# Patient Record
Sex: FEMALE | Race: WHITE | Employment: PART TIME | URBAN - METROPOLITAN AREA
[De-identification: names, ages, dates, MRNs, and addresses within clinical notes are randomized per-mention and may not be internally consistent; named-entity substitution may affect disease eponyms.]

---

## 2020-03-13 ENCOUNTER — HOSPITAL ENCOUNTER (EMERGENCY)
Age: 20
Discharge: HOME | End: 2020-03-13
Attending: EMERGENCY MEDICINE

## 2020-03-13 VITALS
WEIGHT: 238 LBS | HEART RATE: 87 BPM | SYSTOLIC BLOOD PRESSURE: 138 MMHG | HEIGHT: 66 IN | OXYGEN SATURATION: 100 % | DIASTOLIC BLOOD PRESSURE: 91 MMHG | TEMPERATURE: 97.8 F | BODY MASS INDEX: 38.25 KG/M2 | RESPIRATION RATE: 18 BRPM

## 2020-03-13 PROCEDURE — 99283 EMERGENCY DEPT VISIT LOW MDM: CPT

## 2020-03-13 ASSESSMENT — PAIN SCALES - GENERAL: PAINLEVEL_OUTOF10: 4

## 2020-03-14 NOTE — ED TRIAGE NOTES
Pt presents to ED c/o burns to head from dying her hair. Pt rates pain 4/10. Pt is alert and oriented.

## 2020-03-14 NOTE — ED PROVIDER NOTES
Physical activity     Days per week: Not on file     Minutes per session: Not on file    Stress: Not on file   Relationships    Social connections     Talks on phone: Not on file     Gets together: Not on file     Attends Anglican service: Not on file     Active member of club or organization: Not on file     Attends meetings of clubs or organizations: Not on file     Relationship status: Not on file    Intimate partner violence     Fear of current or ex partner: Not on file     Emotionally abused: Not on file     Physically abused: Not on file     Forced sexual activity: Not on file   Other Topics Concern    Not on file   Social History Narrative    Not on file     No current facility-administered medications for this encounter. Current Outpatient Medications   Medication Sig Dispense Refill    RisperiDONE (RISPERDAL PO) Take by mouth      saline nasal gel (AYR) GEL by Nasal route as needed (nasal dryness) 1 Tube 3     Allergies   Allergen Reactions    Sulfa Antibiotics Hives and Swelling       Nursing Notes Reviewed    Physical Exam:  ED Triage Vitals [03/13/20 2313]   Enc Vitals Group      BP (!) 138/91      Pulse 87      Resp 18      Temp 97.8 °F (36.6 °C)      Temp Source Oral      SpO2 100 %      Weight 238 lb (108 kg)      Height 5' 6\" (1.676 m)      Head Circumference       Peak Flow       Pain Score       Pain Loc       Pain Edu? Excl. in [address]? My pulse ox interpretation is - normal    General appearance:  No acute distress. Skin:  Warm. Dry. Head: Hair with dark roots, bleached appearance with overlying pink and red. There are a couple of patches on the top of the scalp that appear mildly erythematous, irritated, no swelling or tenderness to palpation, no blistering. This is an 2 small areas over the left very top of the head, less than quarter size. The rest of the scalp does not appear to have any irritation or redness, no bleeding, no swelling, nontender.   Eye: Extraocular movements intact. Ears, nose, mouth and throat:  Oral mucosa moist   Neck:  Trachea midline. Extremity:  No swelling. Normal ROM    Heart:  Regular rate and rhythm  Perfusion:  intact  Respiratory: Respirations nonlabored. Abdominal:  Non distended. Neurological:  Alert and oriented, normal gait. Psychiatric:  Appropriate    I have reviewed and interpreted all of the currently available lab results from this visit (if applicable):  No results found for this visit on 03/13/20. Radiographs (if obtained):  Radiologist's Report Reviewed:  No results found. EKG (if obtained): (All EKG's are interpreted by myself in the absence of a cardiologist)      MDM:  78-year-old female presents and appears to have some mild chemical irritation from a new hair dye product on exam, no actual chemical burn or other emergent process, advised could take Tylenol or ibuprofen if wished, should improve over the next 12 to 24 hours, advised avoiding direct hydrogen peroxide to the scalp in the future. She will be discharged in stable condition    Clinical Impression:  1. Scalp pain    2. Exposure to chemical irritant      Disposition referral (if applicable):  No follow-up provider specified. Disposition medications (if applicable):  Discharge Medication List as of 3/13/2020 11:26 PM        ED Provider Disposition Time  DISPOSITION Decision To Discharge 03/13/2020 11:17:31 PM      Comment: Please note this report has been produced using speech recognition software and may contain errors related to that system including errors in grammar, punctuation, and spelling, as well as words and phrases that may be inappropriate. Efforts were made to edit the dictations.         Rafael Araiza MD  03/14/20 0104

## 2020-03-14 NOTE — ED NOTES
RN reviewed discharge instructions with pt. Pt denies having questions at this time. Pt is a&ox4. No IV upon discharge.       Naga Interiano RN  03/13/20 020

## 2020-12-03 PROBLEM — K80.10 CALCULUS OF GALLBLADDER WITH CHRONIC CHOLECYSTITIS WITHOUT OBSTRUCTION: Status: ACTIVE | Noted: 2020-12-03

## 2022-01-07 ENCOUNTER — HOSPITAL ENCOUNTER (OUTPATIENT)
Age: 22
Setting detail: SPECIMEN
Discharge: HOME | End: 2022-01-07
Payer: COMMERCIAL

## 2022-01-07 ENCOUNTER — OFFICE VISIT (OUTPATIENT)
Dept: OBGYN | Age: 22
End: 2022-01-07
Payer: COMMERCIAL

## 2022-01-07 VITALS
WEIGHT: 236 LBS | SYSTOLIC BLOOD PRESSURE: 117 MMHG | DIASTOLIC BLOOD PRESSURE: 79 MMHG | BODY MASS INDEX: 39.32 KG/M2 | HEIGHT: 65 IN | HEART RATE: 70 BPM

## 2022-01-07 DIAGNOSIS — Z01.419 WOMEN'S ANNUAL ROUTINE GYNECOLOGICAL EXAMINATION: Primary | ICD-10-CM

## 2022-01-07 DIAGNOSIS — N91.2 AMENORRHEA: ICD-10-CM

## 2022-01-07 DIAGNOSIS — N89.8 VAGINAL ODOR: ICD-10-CM

## 2022-01-07 DIAGNOSIS — N92.6 IRREGULAR MENSES: ICD-10-CM

## 2022-01-07 LAB
CONTROL: NORMAL
PREGNANCY TEST URINE, POC: NEGATIVE

## 2022-01-07 PROCEDURE — 81025 URINE PREGNANCY TEST: CPT

## 2022-01-07 PROCEDURE — 99385 PREV VISIT NEW AGE 18-39: CPT

## 2022-01-07 PROCEDURE — 88142 CYTOPATH C/V THIN LAYER: CPT

## 2022-01-07 RX ORDER — FAMOTIDINE 10 MG
10 TABLET ORAL 2 TIMES DAILY
COMMUNITY
End: 2022-01-27

## 2022-01-07 RX ORDER — RISPERIDONE 0.25 MG/1
0.25 TABLET, FILM COATED ORAL 2 TIMES DAILY
COMMUNITY
End: 2022-01-27

## 2022-01-07 RX ORDER — MONTELUKAST SODIUM 4 MG/1
2 TABLET, CHEWABLE ORAL 2 TIMES DAILY
COMMUNITY

## 2022-01-07 RX ORDER — HYDROXYZINE HYDROCHLORIDE 10 MG/1
10 TABLET, FILM COATED ORAL 3 TIMES DAILY PRN
COMMUNITY
End: 2022-01-27 | Stop reason: CLARIF

## 2022-01-07 RX ORDER — NORGESTIMATE AND ETHINYL ESTRADIOL 0.25-0.035
1 KIT ORAL DAILY
Qty: 1 PACKET | Refills: 11 | Status: SHIPPED | OUTPATIENT
Start: 2022-01-07 | End: 2022-01-27

## 2022-01-07 RX ORDER — CETIRIZINE HYDROCHLORIDE 10 MG/1
10 TABLET ORAL DAILY
COMMUNITY
End: 2022-01-27

## 2022-01-07 ASSESSMENT — ENCOUNTER SYMPTOMS
GASTROINTESTINAL NEGATIVE: 1
ABDOMINAL PAIN: 0
RESPIRATORY NEGATIVE: 1

## 2022-01-07 NOTE — PROGRESS NOTES
daily as needed for Itching      Probiotic Product (PROBIOTIC-10 PO) Take by mouth      cetirizine (ZYRTEC) 10 MG tablet Take 10 mg by mouth daily      norgestimate-ethinyl estradiol (ORTHO-CYCLEN, 28,) 0.25-35 MG-MCG per tablet Take 1 tablet by mouth daily 1 packet 11    lithium (ESKALITH) 450 MG extended release tablet TAKE 1 TABLET BY MOUTH THREE TIMES A DAY      topiramate (TOPAMAX) 50 MG tablet TAKE 1 TABLET (50 MG TOTAL) BY MOUTH EVERY MORNING AND 2 TABLETS (100 MG TOTAL) AT BEDTIME.  etonogestrel-ethinyl estradiol (NUVARING) 0.12-0.015 MG/24HR vaginal ring INSERT 1 RING VAGINALLY EVERY 21 DAYS AND REMOVE FOR 7 DAYS      Biotin 68747 MCG TABS Take by mouth      Omega-3 Fatty Acids (FISH OIL) 1000 MG CPDR Take 5,000 mg by mouth      ferrous sulfate (IRON SLOW RELEASE) 142 (45 Fe) MG extended release tablet Take 142 mg by mouth daily      dicyclomine (BENTYL) 20 MG tablet TAKE 1 TABLET (20 MG TOTAL) BY MOUTH EVERY 6 HOURS AS NEEDED. (Patient not taking: Reported on 2022)      Ashwagandha 125 MG CAPS Take by mouth (Patient not taking: Reported on 2022)      loratadine (CLARITIN) 10 MG capsule Take 10 mg by mouth daily (Patient not taking: Reported on 2022)      saline nasal gel (AYR) GEL by Nasal route as needed (nasal dryness) (Patient not taking: Reported on 2022) 1 Tube 3     No current facility-administered medications for this visit. Allergies   Allergen Reactions    Sulfa Antibiotics Hives and Swelling           Immunization History   Administered Date(s) Administered    COVID-19, Pfizer, PF, 30mcg/0.3mL 2021, 2021       Review of Systems   Constitutional: Negative. Negative for fatigue and fever. Respiratory: Negative. Gastrointestinal: Negative. Negative for abdominal pain. Endocrine: Negative. Genitourinary: Positive for menstrual problem. Negative for vaginal pain. Vaginal odor   Musculoskeletal: Negative. Skin: Negative. Neurological: Negative. Negative for dizziness and headaches. Psychiatric/Behavioral: Negative. /79   Pulse 70   Ht 5' 5\" (1.651 m)   Wt 236 lb (107 kg)   LMP 11/18/2021   BMI 39.27 kg/m²     Physical Exam  Vitals and nursing note reviewed. Constitutional:       General: She is not in acute distress. Appearance: Normal appearance. She is normal weight. HENT:      Head: Normocephalic and atraumatic. Pulmonary:      Effort: Pulmonary effort is normal. No respiratory distress. Chest:   Breasts:      Right: Normal. No axillary adenopathy or supraclavicular adenopathy. Left: Normal. No axillary adenopathy or supraclavicular adenopathy. Abdominal:      Palpations: Abdomen is soft. Tenderness: There is no abdominal tenderness. Genitourinary:     General: Normal vulva. Exam position: Lithotomy position. Vagina: Normal.      Cervix: Normal.      Uterus: Normal.       Adnexa: Right adnexa normal and left adnexa normal.   Musculoskeletal:         General: Normal range of motion. Cervical back: Normal range of motion. Lymphadenopathy:      Upper Body:      Right upper body: No supraclavicular or axillary adenopathy. Left upper body: No supraclavicular or axillary adenopathy. Skin:     General: Skin is warm and dry. Findings: No rash. Neurological:      General: No focal deficit present. Mental Status: She is alert and oriented to person, place, and time. Psychiatric:         Mood and Affect: Mood normal.         Behavior: Behavior normal.         Thought Content: Thought content normal.         Results for orders placed or performed in visit on 01/07/22   POCT urine pregnancy   Result Value Ref Range    Preg Test, Ur negative     Control         Assessment and Plan   Diagnosis Orders   1. Women's annual routine gynecological examination  PAP SMEAR   2. Amenorrhea  POCT urine pregnancy   3.  Irregular menses  norgestimate-ethinyl estradiol (ORTHO-CYCLEN, 28,) 0.25-35 MG-MCG per tablet   4. Vaginal odor  Vaginal Pathogens Probes *A    C.trachomatis N.gonorrhoeae DNA     Pap today, swabs. Vaginal hygiene measures reviewed. Urine pregnancy test negative. Discussed various contraceptive methods including Nexplanon, IUDs, OCPs, patch, depo shot. Pt states she has had nexplanon before and does not want it again, she has also tried sprintec before without issues. Pt wishes to proceed with sprintec at this time, f/u in 3 months. Return in about 3 months (around 4/7/2022) for medication check.     Sherrell Nava PA-C

## 2022-01-08 LAB
CANDIDA SPECIES, DNA PROBE: ABNORMAL
GARDNERELLA VAGINALIS, DNA PROBE: ABNORMAL
TRICHOMONAS VAGINALIS DNA: ABNORMAL

## 2022-01-09 LAB
C TRACH DNA GENITAL QL NAA+PROBE: NEGATIVE
N. GONORRHOEAE DNA: NEGATIVE

## 2022-01-10 DIAGNOSIS — B96.89 BACTERIAL VAGINOSIS: Primary | ICD-10-CM

## 2022-01-10 DIAGNOSIS — N76.0 BACTERIAL VAGINOSIS: Primary | ICD-10-CM

## 2022-01-10 RX ORDER — METRONIDAZOLE 500 MG/1
500 TABLET ORAL 2 TIMES DAILY
Qty: 14 TABLET | Refills: 0 | Status: SHIPPED | OUTPATIENT
Start: 2022-01-10 | End: 2022-01-17

## 2022-01-27 ENCOUNTER — HOSPITAL ENCOUNTER (EMERGENCY)
Age: 22
Discharge: HOME | End: 2022-01-27
Attending: EMERGENCY MEDICINE
Payer: COMMERCIAL

## 2022-01-27 ENCOUNTER — APPOINTMENT (OUTPATIENT)
Dept: CT IMAGING | Age: 22
End: 2022-01-27
Payer: COMMERCIAL

## 2022-01-27 ENCOUNTER — APPOINTMENT (OUTPATIENT)
Dept: GENERAL RADIOLOGY | Age: 22
End: 2022-01-27
Payer: COMMERCIAL

## 2022-01-27 VITALS
TEMPERATURE: 97.7 F | RESPIRATION RATE: 16 BRPM | SYSTOLIC BLOOD PRESSURE: 127 MMHG | HEIGHT: 65 IN | WEIGHT: 230 LBS | OXYGEN SATURATION: 99 % | BODY MASS INDEX: 38.32 KG/M2 | HEART RATE: 70 BPM | DIASTOLIC BLOOD PRESSURE: 87 MMHG

## 2022-01-27 DIAGNOSIS — R56.9 SEIZURE-LIKE ACTIVITY (HCC): ICD-10-CM

## 2022-01-27 DIAGNOSIS — R20.2 PARESTHESIA: Primary | ICD-10-CM

## 2022-01-27 LAB
ACETAMINOPHEN LEVEL: <5 UG/ML (ref 15–30)
ALBUMIN SERPL-MCNC: 4.1 GM/DL (ref 3.4–5)
ALCOHOL SCREEN SERUM: <0.01 %WT/VOL
ALP BLD-CCNC: 65 IU/L (ref 40–129)
ALT SERPL-CCNC: 33 U/L (ref 10–40)
AMMONIA: 33 UMOL/L (ref 11–51)
AMPHETAMINES: NEGATIVE
ANION GAP SERPL CALCULATED.3IONS-SCNC: 13 MMOL/L (ref 4–16)
AST SERPL-CCNC: 22 IU/L (ref 15–37)
BACTERIA: NEGATIVE /HPF
BARBITURATE SCREEN URINE: NEGATIVE
BASOPHILS ABSOLUTE: 0.1 K/CU MM
BASOPHILS RELATIVE PERCENT: 0.9 % (ref 0–1)
BENZODIAZEPINE SCREEN, URINE: NEGATIVE
BILIRUB SERPL-MCNC: 0.5 MG/DL (ref 0–1)
BILIRUBIN URINE: NEGATIVE MG/DL
BLOOD, URINE: ABNORMAL
BUN BLDV-MCNC: 7 MG/DL (ref 6–23)
CALCIUM SERPL-MCNC: 9 MG/DL (ref 8.3–10.6)
CANNABINOID SCREEN URINE: NEGATIVE
CHLORIDE BLD-SCNC: 107 MMOL/L (ref 99–110)
CLARITY: ABNORMAL
CO2: 17 MMOL/L (ref 21–32)
COCAINE METABOLITE: NEGATIVE
COLOR: YELLOW
CREAT SERPL-MCNC: 0.8 MG/DL (ref 0.6–1.1)
DIFFERENTIAL TYPE: ABNORMAL
DOSE AMOUNT: ABNORMAL
DOSE AMOUNT: ABNORMAL
DOSE TIME: ABNORMAL
DOSE TIME: ABNORMAL
EKG ATRIAL RATE: 80 BPM
EKG DIAGNOSIS: NORMAL
EKG P AXIS: 57 DEGREES
EKG P-R INTERVAL: 138 MS
EKG Q-T INTERVAL: 390 MS
EKG QRS DURATION: 78 MS
EKG QTC CALCULATION (BAZETT): 449 MS
EKG R AXIS: 11 DEGREES
EKG T AXIS: -4 DEGREES
EKG VENTRICULAR RATE: 80 BPM
EOSINOPHILS ABSOLUTE: 0.4 K/CU MM
EOSINOPHILS RELATIVE PERCENT: 3.8 % (ref 0–3)
GFR AFRICAN AMERICAN: >60 ML/MIN/1.73M2
GFR NON-AFRICAN AMERICAN: >60 ML/MIN/1.73M2
GLUCOSE BLD-MCNC: 110 MG/DL (ref 70–99)
GLUCOSE BLD-MCNC: 92 MG/DL (ref 70–99)
GLUCOSE, URINE: NEGATIVE MG/DL
HCT VFR BLD CALC: 43.9 % (ref 37–47)
HEMOGLOBIN: 14.5 GM/DL (ref 12.5–16)
IMMATURE NEUTROPHIL %: 0.8 % (ref 0–0.43)
INR BLD: 0.81 INDEX
INTERPRETATION: NORMAL
KETONES, URINE: NEGATIVE MG/DL
LEUKOCYTE ESTERASE, URINE: ABNORMAL
LITHIUM LEVEL: 0.7 MMOL/L (ref 0.6–1.2)
LYMPHOCYTES ABSOLUTE: 3.2 K/CU MM
LYMPHOCYTES RELATIVE PERCENT: 33.2 % (ref 24–44)
MCH RBC QN AUTO: 31.7 PG (ref 27–31)
MCHC RBC AUTO-ENTMCNC: 33 % (ref 32–36)
MCV RBC AUTO: 95.9 FL (ref 78–100)
MONOCYTES ABSOLUTE: 0.8 K/CU MM
MONOCYTES RELATIVE PERCENT: 7.9 % (ref 0–4)
MUCUS: ABNORMAL HPF
NITRITE URINE, QUANTITATIVE: NEGATIVE
NUCLEATED RBC %: 0 %
OPIATES, URINE: NEGATIVE
OXYCODONE: NEGATIVE
PDW BLD-RTO: 11.8 % (ref 11.7–14.9)
PH, URINE: 8 (ref 5–8)
PHENCYCLIDINE, URINE: NEGATIVE
PLATELET # BLD: 426 K/CU MM (ref 140–440)
PMV BLD AUTO: 9.7 FL (ref 7.5–11.1)
POTASSIUM SERPL-SCNC: 3.7 MMOL/L (ref 3.5–5.1)
PREGNANCY, URINE: NEGATIVE
PROTEIN UA: NEGATIVE MG/DL
PROTHROMBIN TIME: 10.4 SECONDS (ref 11.7–14.5)
RBC # BLD: 4.58 M/CU MM (ref 4.2–5.4)
RBC URINE: 1 /HPF (ref 0–6)
SALICYLATE LEVEL: 1.5 MG/DL (ref 15–30)
SEGMENTED NEUTROPHILS ABSOLUTE COUNT: 5.2 K/CU MM
SEGMENTED NEUTROPHILS RELATIVE PERCENT: 53.4 % (ref 36–66)
SODIUM BLD-SCNC: 137 MMOL/L (ref 135–145)
SPECIFIC GRAVITY UA: 1 (ref 1–1.03)
SPECIFIC GRAVITY, URINE: 1 (ref 1–1.03)
SQUAMOUS EPITHELIAL: 14 /HPF
TOTAL IMMATURE NEUTOROPHIL: 0.08 K/CU MM
TOTAL NUCLEATED RBC: 0 K/CU MM
TOTAL PROTEIN: 7.1 GM/DL (ref 6.4–8.2)
TRICHOMONAS: ABNORMAL /HPF
TROPONIN T: <0.01 NG/ML
UROBILINOGEN, URINE: NEGATIVE MG/DL (ref 0.2–1)
WBC # BLD: 9.8 K/CU MM (ref 4–10.5)
WBC UA: 2 /HPF (ref 0–5)

## 2022-01-27 PROCEDURE — 80053 COMPREHEN METABOLIC PANEL: CPT

## 2022-01-27 PROCEDURE — G0480 DRUG TEST DEF 1-7 CLASSES: HCPCS

## 2022-01-27 PROCEDURE — 80201 ASSAY OF TOPIRAMATE: CPT

## 2022-01-27 PROCEDURE — 82140 ASSAY OF AMMONIA: CPT

## 2022-01-27 PROCEDURE — 71045 X-RAY EXAM CHEST 1 VIEW: CPT

## 2022-01-27 PROCEDURE — 81001 URINALYSIS AUTO W/SCOPE: CPT

## 2022-01-27 PROCEDURE — 93005 ELECTROCARDIOGRAM TRACING: CPT | Performed by: EMERGENCY MEDICINE

## 2022-01-27 PROCEDURE — 85025 COMPLETE CBC W/AUTO DIFF WBC: CPT

## 2022-01-27 PROCEDURE — 85610 PROTHROMBIN TIME: CPT

## 2022-01-27 PROCEDURE — 82962 GLUCOSE BLOOD TEST: CPT

## 2022-01-27 PROCEDURE — 93010 ELECTROCARDIOGRAM REPORT: CPT | Performed by: INTERNAL MEDICINE

## 2022-01-27 PROCEDURE — 84484 ASSAY OF TROPONIN QUANT: CPT

## 2022-01-27 PROCEDURE — 99285 EMERGENCY DEPT VISIT HI MDM: CPT

## 2022-01-27 PROCEDURE — 80307 DRUG TEST PRSMV CHEM ANLYZR: CPT

## 2022-01-27 PROCEDURE — 80178 ASSAY OF LITHIUM: CPT

## 2022-01-27 PROCEDURE — 70450 CT HEAD/BRAIN W/O DYE: CPT

## 2022-01-27 PROCEDURE — 81025 URINE PREGNANCY TEST: CPT

## 2022-01-27 RX ORDER — RISPERIDONE 1 MG/1
1 TABLET, FILM COATED ORAL NIGHTLY
COMMUNITY

## 2022-01-27 RX ORDER — LITHIUM CARBONATE 450 MG
450 TABLET, EXTENDED RELEASE ORAL 2 TIMES DAILY
COMMUNITY

## 2022-01-27 RX ORDER — HYDROXYZINE PAMOATE 25 MG/1
25 CAPSULE ORAL NIGHTLY
COMMUNITY

## 2022-01-27 NOTE — Clinical Note
Heather Rodriguez was seen and treated in our emergency department on 1/27/2022. She may return to work on 01/31/2022. If you have any questions or concerns, please don't hesitate to call.       Marion Roblero MD

## 2022-01-27 NOTE — ED NOTES
Pt. Experienced approximately 2 minutes of tremors and nausea during transfer from Trauma room 4 to exam room 32.      Clay Show  01/27/22 8769

## 2022-01-27 NOTE — ED NOTES
CT Head WO Contrast    Status: Final result       Order Providers    Authorizing Billing   Court MD Chano Faustin MD            Signed by    Signed Date/Time Phone Pager   Mekhi Pulido 1/27/2022 12:26 [phone]      Reading Providers    Read Date Phone Pager   Mekhi Pulido Thu Jan 27, 2022 12:26 [phone]        CT Head WO Contrast: Patient Communication     Not Released  Not seen     Radiation Dose Estimates    No radiation information found for this patient  Narrative   EXAMINATION:   CT OF THE HEAD WITHOUT CONTRAST  1/27/2022 12:14 pm       TECHNIQUE:   CT of the head was performed without the administration of intravenous   contrast. Dose modulation, iterative reconstruction, and/or weight based   adjustment of the mA/kV was utilized to reduce the radiation dose to as low   as reasonably achievable.       COMPARISON:   None.       HISTORY:   Reason for Exam: paresthesia bilateral hands       FINDINGS:   BRAIN/VENTRICLES: No acute intracranial hemorrhage, mass effect or midline   shift.  No abnormal extra-axial fluid collection.  The gray-white   differentiation is maintained without evidence of an acute infarct.  No   evidence of hydrocephalus.       ORBITS: The visualized portion of the orbits demonstrate no acute abnormality.       SINUSES: The visualized paranasal sinuses and mastoid air cells appear clear.       SOFT TISSUES/SKULL:  No acute abnormality of the visualized skull or soft   tissues.           Impression   Negative noncontrast CT examination of the brain.              Kerri Albarran RN  01/27/22 3397

## 2022-01-27 NOTE — ED NOTES
Bed: ED-32  Expected date:   Expected time:   Means of arrival:   Comments:  Kim Boyle  01/27/22 8259

## 2022-01-27 NOTE — ED PROVIDER NOTES
Emergency [address] EMERGENCY DEPARTMENT    Patient: Dariel Breen  MRN: [id number]  : 2000  Date of Evaluation: 2022  ED Provider: Corinne Monroe MD    Chief Complaint       Chief Complaint   Patient presents with    Tremors     head, hands    Numbness     tingling around mouth     Anson Daniels is a 19 y.o. female who presents to the emergency department for evaluation of perioral tingling, bilateral eyelid fluttering and shaking of hands bilaterally. Patient reports been usual state of health until approximate 11:00 today when symptoms first started. No reported fevers no psychotics no recent travel no change of diet or medications and no trauma. Patient says that she was driving when symptoms occurred never had similar symptoms to this in the past.  Denies any difficulty swallowing difficulty speaking or difficulty walking. Patient does report taking lithium she takes it for and 50 mg 3 times daily. No recent changes in medications. Never had similar symptoms of this in the past.    ROS:     At least 10 systems reviewed and otherwise acutely negative except as in the [address]     Past History     Past Medical History:   Diagnosis Date    Bipolar 1 disorder ([address]     Epistaxis     Irregular menses     Irritable bowel syndrome     Migraines     Obesity     Seasonal allergies      Past Surgical History:   Procedure Laterality Date    CHOLECYSTECTOMY       Social History     Socioeconomic History    Marital status: Single     Spouse name: None    Number of children: None    Years of education: None    Highest education level: None   Occupational History    None   Tobacco Use    Smoking status: Never Smoker    Smokeless tobacco: Never Used   Vaping Use    Vaping Use: Never used   Substance and Sexual Activity    Alcohol use: Not Currently    Drug use: Never    Sexual activity: None   Other Topics Concern    None Social History Narrative    None     Social Determinants of Health     Financial Resource Strain:     Difficulty of Paying Living Expenses: Not on file   Food Insecurity:     Worried About Running Out of Food in the Last Year: Not on file    Reese of Food in the Last Year: Not on file   Transportation Needs:     Lack of Transportation (Medical): Not on file    Lack of Transportation (Non-Medical):  Not on file   Physical Activity:     Days of Exercise per Week: Not on file    Minutes of Exercise per Session: Not on file   Stress:     Feeling of Stress : Not on file   Social Connections:     Frequency of Communication with Friends and Family: Not on file    Frequency of Social Gatherings with Friends and Family: Not on file    Attends Zoroastrianism Services: Not on file    Active Member of [address] [place] [address] [employer] or Organizations: Not on file    Attends Club or Organization Meetings: Not on file    Marital Status: Not on file   Intimate Partner Violence:     Fear of Current or Ex-Partner: Not on file    Emotionally Abused: Not on file    Physically Abused: Not on file    Sexually Abused: Not on file   Housing Stability:     Unable to Pay for Housing in the Last Year: Not on file    Number of [place] in the Last Year: Not on file    Unstable Housing in the Last Year: Not on file       Medications/Allergies     Previous Medications    COLESTIPOL (COLESTID) 1 G TABLET    Take 2 g by mouth 2 times daily     FERROUS SULFATE (IRON SLOW RELEASE) 142 (45 FE) MG EXTENDED RELEASE TABLET    Take 142 mg by mouth daily OTC    HYDROXYZINE (VISTARIL) 25 MG CAPSULE    Take 25 mg by mouth nightly    LITHIUM (ESKALITH) 450 MG EXTENDED RELEASE TABLET    Take 600 mg by mouth every morning Takes a  mg with the  to equal 600 mg q am.    LITHIUM (ESKALITH) 450 MG EXTENDED RELEASE TABLET    Take 450 mg by mouth 2 times daily Takes in the afternoon and at HS    RISPERIDONE (RISPERDAL) 1 MG TABLET    Take 1 mg by mouth nightly     TOPIRAMATE (TOPAMAX) 50 MG TABLET    50 mg 2 times daily      Allergies   Allergen Reactions    Sulfa Antibiotics Hives and Swelling        Physical Exam       ED Triage Vitals [01/27/22 1148]   BP Temp Temp Source Pulse Resp SpO2 Height Weight   (!) 148/97 98.5 °F (36.9 °C) Oral 111 29 100 % 5' 5\" (1.651 m) 230 lb (104.3 kg)     GENERAL APPEARANCE: Awake and alert. Cooperative. No acute distress. HEAD: Normocephalic. Atraumatic. EYES: Sclera anicteric. Pupils equal round reactive to light extraocular movements are intact  ENT: Tolerates saliva. No trismus. Moist mucous membranes  NECK: Supple. Trachea midline. No meningismus  CARDIO: RRR. Radial pulse 2+. No murmurs rubs or gallops appreciated  LUNGS: Respirations unlabored. CTAB. No accessory muscle usage noted. No wheezes rales rhonchi or stridor. ABDOMEN: Soft. Non-distended. Non-tender. No tenderness in right upper quadrant or right lower quadrant to deep palpation  EXTREMITIES: No acute deformities. No unilateral leg swelling or tenderness behind either one of calves  SKIN: Warm and dry. No erythema edema or rashes appreciated  NEUROLOGICAL:  Cranial nerves II through XII grossly intact. No gross facial drooping. Moves all 4 extremities spontaneously. Normal phonation. Patient has normal finger-to-nose normal rapid altering movements negative Romberg negative pronation. 5/5 upper and lower strength intact sensation light touch 2+ DTRs at patellar tendons. Negative hints exam.  Symmetric smile tongue is midline. PSYCHIATRIC: Normal mood. Alert and oriented x3. No reported active suicidality or homicidality.     Diagnostics   Labs:  Results for orders placed or performed during the hospital encounter of 01/27/22   CBC Auto Differential   Result Value Ref Range    WBC 9.8 4.0 - 10.5 K/CU MM    RBC 4.58 4.2 - 5.4 M/CU MM    Hemoglobin 14.5 12.5 - 16.0 GM/DL    Hematocrit 43.9 37 - 47 %    MCV 95.9 78 - 100 FL    MCH 31.7 (H) 27 - 31 PG    MCHC 33.0 32.0 - 36.0 %    RDW 11.8 11.7 - 14.9 %    Platelets 686 596 - 487 K/CU MM    MPV 9.7 7.5 - 11.1 FL    Differential Type AUTOMATED DIFFERENTIAL     Segs Relative 53.4 36 - 66 %    Lymphocytes % 33.2 24 - 44 %    Monocytes % 7.9 (H) 0 - 4 %    Eosinophils % 3.8 (H) 0 - 3 %    Basophils % 0.9 0 - 1 %    Segs Absolute 5.2 K/CU MM    Lymphocytes Absolute 3.2 K/CU MM    Monocytes Absolute 0.8 K/CU MM    Eosinophils Absolute 0.4 K/CU MM    Basophils Absolute 0.1 K/CU MM    Nucleated RBC % 0.0 %    Total Nucleated RBC 0.0 K/CU MM    Total Immature Neutrophil 0.08 K/CU MM    Immature Neutrophil % 0.8 (H) 0 - 0.43 %   Comprehensive Metabolic Panel w/ Reflex to MG   Result Value Ref Range    Sodium 137 135 - 145 MMOL/L    Potassium 3.7 3.5 - 5.1 MMOL/L    Chloride 107 99 - 110 mMol/L    CO2 17 (L) 21 - 32 MMOL/L    BUN 7 6 - 23 MG/DL    CREATININE 0.8 0.6 - 1.1 MG/DL    Glucose 92 70 - 99 MG/DL    Calcium 9.0 8.3 - 10.6 MG/DL    Albumin 4.1 3.4 - 5.0 GM/DL    Total Protein 7.1 6.4 - 8.2 GM/DL    Total Bilirubin 0.5 0.0 - 1.0 MG/DL    ALT 33 10 - 40 U/L    AST 22 15 - 37 IU/L    Alkaline Phosphatase 65 40 - 129 IU/L    GFR Non-African American >60 >60 mL/min/1.73m2    GFR African American >60 >60 mL/min/1.73m2    Anion Gap 13 4 - 16   Troponin   Result Value Ref Range    Troponin T <0.010 <0.01 NG/ML   Protime-INR   Result Value Ref Range    Protime 10.4 (L) 11.7 - 14.5 SECONDS    INR 0.81 INDEX   Urinalysis, reflex to microscopic   Result Value Ref Range    Color, UA YELLOW YELLOW    Clarity, UA HAZY (A) CLEAR    Glucose, Urine NEGATIVE NEGATIVE MG/DL    Bilirubin Urine NEGATIVE NEGATIVE MG/DL    Ketones, Urine NEGATIVE NEGATIVE MG/DL    Specific Gravity, UA 1.005 1.001 - 1.035    Blood, Urine SMALL (A) NEGATIVE    pH, Urine 8.0 5.0 - 8.0    Protein, UA NEGATIVE NEGATIVE MG/DL    Urobilinogen, Urine NEGATIVE 0.2 - 1.0 MG/DL    Nitrite Urine, Quantitative NEGATIVE NEGATIVE    Leukocyte Esterase, Urine MODERATE (A) NEGATIVE    RBC, UA 1 0 - 6 /HPF    WBC, UA 2 0 - 5 /HPF    Bacteria, UA NEGATIVE NEGATIVE /HPF    Squam Epithel, UA 14 /HPF    Mucus, UA RARE (A) NEGATIVE HPF    Trichomonas, UA NONE SEEN NONE SEEN /HPF   Pregnancy, Urine   Result Value Ref Range    Pregnancy, Urine NEGATIVE NEGATIVE    Specific Gravity, Urine 1.005 1.001 - 1.035    Interpretation HCG METHOD LIMITATIONS:    Lithium level   Result Value Ref Range    Lithium Lvl 0.7 0.6 - 1.2 MMOL/L   Acetaminophen Level   Result Value Ref Range    Acetaminophen Level <5.0 (L) 15 - 30 ug/ml    DOSE AMOUNT DOSE AMT. GIVEN - UNKNOWN     DOSE TIME DOSE TIME GIVEN - UNKNOWN    Ethanol   Result Value Ref Range    Alcohol Scrn <0.01 <4.76 %WT/VOL   Salicylate Level   Result Value Ref Range    Salicylate Lvl 1.5 (L) 15 - 30 MG/DL    DOSE AMOUNT DOSE AMT.  GIVEN - UNKNOWN     DOSE TIME DOSE TIME GIVEN - UNKNOWN    Urine Drug Screen   Result Value Ref Range    Cannabinoid Scrn, Ur NEGATIVE NEGATIVE    Amphetamines NEGATIVE NEGATIVE    Cocaine Metabolite NEGATIVE NEGATIVE    Benzodiazepine Screen, Urine NEGATIVE NEGATIVE    Barbiturate Screen, Ur NEGATIVE NEGATIVE    Opiates, Urine NEGATIVE NEGATIVE    Phencyclidine, Urine NEGATIVE NEGATIVE    Oxycodone NEGATIVE NEGATIVE   Ammonia Level   Result Value Ref Range    Ammonia 33 11 - 51 UMOL/L   POCT Glucose   Result Value Ref Range    POC Glucose 110 (H) 70 - 99 MG/DL   EKG 12 Lead   Result Value Ref Range    Ventricular Rate 80 BPM    Atrial Rate 80 BPM    P-R Interval 138 ms    QRS Duration 78 ms    Q-T Interval 390 ms    QTc Calculation (Bazett) 449 ms    P Axis 57 degrees    R Axis 11 degrees    T Axis -4 degrees    Diagnosis       Normal sinus rhythm  Nonspecific T wave abnormality  Abnormal ECG  No previous ECGs available       Radiographs:  CT Head WO Contrast    Result Date: 1/27/2022  EXAMINATION: CT OF THE HEAD WITHOUT CONTRAST  1/27/2022 12:14 pm TECHNIQUE: CT of the head was performed without the administration of intravenous contrast. Dose modulation, iterative reconstruction, and/or weight based adjustment of the mA/kV was utilized to reduce the radiation dose to as low as reasonably achievable. COMPARISON: None. HISTORY: Reason for Exam: paresthesia bilateral hands FINDINGS: BRAIN/VENTRICLES: No acute intracranial hemorrhage, mass effect or midline shift. No abnormal extra-axial fluid collection. The gray-white differentiation is maintained without evidence of an acute infarct. No evidence of hydrocephalus. ORBITS: The visualized portion of the orbits demonstrate no acute abnormality. SINUSES: The visualized paranasal sinuses and mastoid air cells appear clear. SOFT TISSUES/SKULL:  No acute abnormality of the visualized skull or soft tissues. Negative noncontrast CT examination of the brain. XR CHEST PORTABLE    Result Date: 1/27/2022  EXAMINATION: ONE XRAY VIEW OF THE CHEST 1/27/2022 12:44 pm COMPARISON: None. HISTORY: ORDERING SYSTEM PROVIDED HISTORY: dyspnea TECHNOLOGIST PROVIDED HISTORY: Reason for exam:->dyspnea Initial encounter FINDINGS: No focal consolidation, pleural effusion or pneumothorax. The cardiomediastinal silhouette is unremarkable. No overt pulmonary edema. No acute osseous abnormality. No acute cardiopulmonary findings. Procedures/EKG:   Patient screening EKG as interpreted by me sinus rhythm rate 80   no ST elevation no ST depression flattened T waves throughout I appreciate no acute ischemia or infarctions EKG no old EKGs with which to compare    ED Course and MDM   In brief, Ines Veliz is a 19 y.o. female who presented to the emergency department for evaluation of perioral tingling as well as bilateral eyelid fluttering and bilateral hand paresthesias.   Based on patient's history physical would be most concerned about possible hyperventilation syndrome other possibilities patient symptoms do include electrolyte abnormalities lithium toxicity topiramate toxicity or other exposure. Patient has no focal neurologic deficits that are lateralizing or expressing difficulty swallowing or difficulty speaking. Low clinical suspicion for acute stroke at this time given her nonlateralizing symptomology. I did review patient's imaging studies and laboratory work as noted above. All the laboratory work other than the Topamax has been resulted no significant abnormalities. Patient does have moderate amount of leukocytes in her urine but patient denies any urinary symptoms. I discussed the findings with patient and family at bedside. Family family patient do report that she does have a strong family history of epilepsy on her father's side and she is wondering whether or not this may be a sign of seizures. Patient is never seen a neurologist for this and would like a recommendation to 1 and she is requesting to be discharged home. Recommend close follow-up with primary care physician or referral physician in the next 24 to 48 hours. Return precautions were discussed with her including but not limited to chest pain, shortness of breath, syncope, weakness or grand mal seizures. She did express verbal understanding of these instructions and does feel comfortable to be discharged home at this time. ED Medication Orders (From admission, onward)    None          Final Impression      1. Paresthesia    2. Seizure-like activity ([facility] [address]      DISPOSITION           This patient was cared for in the setting of the COVID-19 pandemic, with nationwide stress on resources and staffing.     (Please note that portions of this note may have been completed with a voice recognition program. Efforts were made to edit the dictations but occasionally words are mis-transcribed.)    Alexandrea Christianson MD  [id number] MD  01/27/22 9057

## 2022-01-27 NOTE — ED NOTES
To and from restroom without difficulty. Pt states that her face feels back to normal now.      Jose Luis Chicas RN  01/27/22 [id number]

## 2022-01-28 ENCOUNTER — OFFICE VISIT (OUTPATIENT)
Dept: NEUROLOGY | Age: 22
End: 2022-01-28
Payer: COMMERCIAL

## 2022-01-28 VITALS
HEART RATE: 92 BPM | WEIGHT: 232.2 LBS | OXYGEN SATURATION: 98 % | DIASTOLIC BLOOD PRESSURE: 80 MMHG | SYSTOLIC BLOOD PRESSURE: 116 MMHG | HEIGHT: 65 IN | BODY MASS INDEX: 38.69 KG/M2

## 2022-01-28 DIAGNOSIS — R56.9 SEIZURE-LIKE ACTIVITY (HCC): Primary | ICD-10-CM

## 2022-01-28 PROCEDURE — 99205 OFFICE O/P NEW HI 60 MIN: CPT | Performed by: PSYCHIATRY & NEUROLOGY

## 2022-01-28 PROCEDURE — 1111F DSCHRG MED/CURRENT MED MERGE: CPT | Performed by: PSYCHIATRY & NEUROLOGY

## 2022-01-28 RX ORDER — ALBUTEROL SULFATE 90 UG/1
2 AEROSOL, METERED RESPIRATORY (INHALATION) EVERY 6 HOURS PRN
COMMUNITY
Start: 2021-05-25

## 2022-01-28 RX ORDER — NORGESTIMATE AND ETHINYL ESTRADIOL 0.25-0.035
1 KIT ORAL DAILY
COMMUNITY

## 2022-01-28 NOTE — PROGRESS NOTES
1/28/22    Cici Banks  2000    Chief Complaint   Patient presents with    Follow-Up from Hospital     pt presents from [facility] for possible seizure, pt states no history of seizures, pt states there is a family history of epilepsy, pt states 2 weeks ago her birth control changed, pt states she has had a lot of fatigue since the possible seizure activity       History of Present Illness    Ramón Magaña presents in neurologic consultation accompanied by her mother Richa Thompson for possible seizure yesterday. She states that she was feeling relatively in her normal state of health yesterday and was driving to get some lunch. She then started to feel little nauseated and then started having some shaking of her head that lasted 10 minutes. She pulled over and called her doctor's office which told her to go to the emergency room. She then had another spell characterized by nodding of her head and twitching of her eyelids which occurred just prior to getting to the emergency room. Then while in the emergency room she had another spell characterized by nodding of her head, twitching of her eyelids, staring off into space, and tremoring of her hands. Her mom states that it was a fine rapid tremor and that the eyelids were not violently blinking but the eyelids were rapidly fluttering. She states she has a mild tremor sometimes but thinks it may be due to her some of her psychiatric medications for her bipolar disorder. She has a paternal aunt with tremors and she has a paternal grandfather and paternal cousin with epilepsy. She has never had seizures before. She has had panic attacks in the past but nothing similar to this. She has been feeling extremely exhausted since that time. Her lithium level was found to be normal in the emergency room. All of her other labs were normal as well. She had a CT of the head which was normal.  She cannot get an MRI due to permanent retainer in her teeth.   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 She follows with psychiatry for bipolar disorder and is on hydroxyzine, risperidone, and lithium. She takes Topamax 50 mg twice daily for migraines but states that she has not had a migraine in some time. Subjective    Review of Symptoms:  Neurologic   Symptoms: seizures, no difficulty with gait or walking, no bowel symptoms, no vertigo, no confusion, no memory loss, no speech disorder, no visual loss, no double vision, no dizziness, no loss of hearing, no sensory disturbances, no weakness, no headaches, no bladder symptoms, no excessive fatigue and no syncope    Current Outpatient Medications   Medication Sig Dispense Refill    norgestimate-ethinyl estradiol (TANA) 0.25-35 MG-MCG per tablet Take 1 tablet by mouth daily      albuterol sulfate  (90 Base) MCG/ACT inhaler Inhale 2 puffs into the lungs every 6 hours as needed      hydrOXYzine (VISTARIL) 25 MG capsule Take 25 mg by mouth nightly      lithium (ESKALITH) 450 MG extended release tablet Take 450 mg by mouth 2 times daily Takes in the afternoon and at HS      risperiDONE (RISPERDAL) 1 MG tablet Take 1 mg by mouth nightly       colestipol (COLESTID) 1 g tablet Take 2 g by mouth 2 times daily       lithium (ESKALITH) 450 MG extended release tablet Take 600 mg by mouth every morning Takes a  mg with the  to equal 600 mg q am.      topiramate (TOPAMAX) 50 MG tablet 50 mg 2 times daily       ferrous sulfate (IRON SLOW RELEASE) 142 (45 Fe) MG extended release tablet Take 142 mg by mouth daily OTC       No current facility-administered medications for this visit.        Past Medical History:   Diagnosis Date    Bipolar 1 disorder ([facility] [address]     Epistaxis     Irregular menses     Irritable bowel syndrome     Migraines     Obesity     Seasonal allergies        Past Surgical History:   Procedure Laterality Date    CHOLECYSTECTOMY Social History     Socioeconomic History    Marital status: Single     Spouse name: None    Number of children: None    Years of education: None    Highest education level: None   Occupational History    None   Tobacco Use    Smoking status: Never Smoker    Smokeless tobacco: Never Used   Vaping Use    Vaping Use: Never used   Substance and Sexual Activity    Alcohol use: Not Currently    Drug use: Never    Sexual activity: None   Other Topics Concern    None   Social History Narrative    None     Social Determinants of Health     Financial Resource Strain:     Difficulty of Paying Living Expenses: Not on file   Food Insecurity:     Worried About Running Out of Food in the Last Year: Not on file    Reese of Food in the Last Year: Not on file   Transportation Needs:     Lack of Transportation (Medical): Not on file    Lack of Transportation (Non-Medical):  Not on file   Physical Activity:     Days of Exercise per Week: Not on file    Minutes of Exercise per Session: Not on file   Stress:     Feeling of Stress : Not on file   Social Connections:     Frequency of Communication with Friends and Family: Not on file    Frequency of Social Gatherings with Friends and Family: Not on file    Attends Rastafari Services: Not on file    Active Member of [address] [place] [address] [employer] or Organizations: Not on file    Attends Club or Organization Meetings: Not on file    Marital Status: Not on file   Intimate Partner Violence:     Fear of Current or Ex-Partner: Not on file    Emotionally Abused: Not on file    Physically Abused: Not on file    Sexually Abused: Not on file   Housing Stability:     Unable to Pay for Housing in the Last Year: Not on file    Number of [place] in the Last Year: Not on file    Unstable Housing in the Last Year: Not on file       Family History   Problem Relation Age of Onset    Ovarian Cancer Paternal Grandmother     Cervical Cancer Paternal Grandmother     Diabetes Maternal Grandmother     Diabetes Father     Hypertension Father     Epilepsy Paternal Aunt     Epilepsy Paternal Grandfather     Epilepsy Paternal Cousin        Objective    Physical Exam:  Also present during visit: Mother Vivek Lacey.      Constitutional   Weight: well nourished  Heart/Vascular   Rate and Rhythm: RRR   Murmurs: none   Arterial Pulses:  no carotid bruits  Neck   Appearance/Palpation/Auscultation: supple  Mental Status   Orientation: oriented to person, oriented to place, oriented to problem and oriented to time   Mood/Affect: appropriate mood and appropriate affect   Memory/Other: recent memory intact, remote memory intact, fund of knowledge intact, attention span normal and concentration normal  Language   Language: (normal) language, no dysarthria, (normal) articulation and no dysphasia/aphasia  Cranial Nerves   CN II Right: visual fields appear intact   CN II Left: visual fields appear intact   CN III, IV, VI: EOM no nystagmus, normal pursuit and extraocular muscle strength normal   CN III: pupil normal size, pupil reactive to light and dark, pupil accomodates and no ptosis   CN IV: normal   CN VI: normal   CN V Right: normal sensation and muscles of mastication intact   CN V Left: normal sensation and muscles of mastication intact   CN VII Right: normal facial expression   CN VII Left: normal facial expression   CN VIII Right: hearing in tact to normal conversation   CN VIII Left: hearing in tact to normal conversation   CN IX,X: normal palatal movement   CN XI Right: normal sternocleidomastoid and normal trapezius   CN XI Left: normal sternocleidomastoid and normal trapezius   CN XII: no tremors of the tongue, no fasciculation of the tongue, tongue protrudes midline, normal power to left and normal power to right  Gait and Stance   Gait/Posture: station normal, ambulates independently, gait normal and Romberg's test normal  Motor/Coordination Exam   General: no bradykinesia, no tremors, no chorea, no athetosis, no myoclonus and no dyskinesia   Right Upper Extremity: normal motor strength, normal bulk and normal tone   Left Upper Extremity: normal motor strength, normal bulk and normal tone   Right Lower Extremity: normal motor strength, normal bulk and normal tone   Left Lower Extremity: normal motor strength, normal bulk and normal tone   Coordination: no drift, normal finger-to-nose and rapid alternating movements normal  Reflexes   Reflexes Right: DTRS are normal throughout   Reflexes Left: DTRS are normal throughout  Sensory   Sensation: normal light touch, normal pinprick, normal temperature, normal vibration, normal position, normal DSS and no neglect  Spine   Cervical Spine: no tenderness, no dystonia  and full ROM   Thoracic Spine: no spasms, no bony abnormalities, normal curvature, no tenderness and full ROM   Low Back: full ROM, no pain, no spasms and no bony abnormalities  Lungs   Auscultation: normal breath sounds  Skin   Inspection: no jaundice, no lesions, no rashes and no cyanosis      /80 (Site: Left Upper Arm, Position: Sitting, Cuff Size: Large Adult)   Pulse 92   Ht 5' 5\" (1.651 m)   Wt 232 lb 3.2 oz (105.3 kg)   LMP 01/20/2022   SpO2 98%   BMI 38.64 kg/m²     Assessment and Plan     Diagnosis Orders   1. Seizure-like activity ([facility] [address]  EEG     Rachele Ma presents today after hospital ER visit for seizure-like activity. The characteristics of her fit activity were atypical and perhaps represented a psychogenic nonepileptic seizure rather than a true epileptic event. Her CT of the head was unremarkable. Laboratory evaluation was also nonrevealing. Her lithium level was found to be normal.  Her topiramate level was also obtained which is pending at this time but she is on rather low dose of 50 mg twice daily. I will obtain an EEG to ensure there is not any cortical irritability and propensity for seizures. We will make further recommendations based on this testing.   If it does show any epileptic potentials may choose to increase Topamax or add another antiepileptic agent. If it is normal I would just watch her clinically and make further recommendations depending on the recurrence of events. Return in about 3 months (around 4/28/2022).     Dwayne Walter, DO

## 2022-01-29 LAB — TOPIRAMATE LEVEL: 3.7 UG/ML (ref 5–20)

## 2022-02-01 ENCOUNTER — TELEPHONE (OUTPATIENT)
Dept: NEUROLOGY | Age: 22
End: 2022-02-01

## 2022-02-01 NOTE — TELEPHONE ENCOUNTER
Patient called in distress stating she is still continuing to have these episodes where she is uncontrolably shaking and sometimes not cohierent during them. Patient states it is becoming unlivable being like this and she cannot function in her daily life. Patient stated clear fear, but calmed patient down and explained that she is doing everything she is suppose to be doing at this point and is even already scheduled for an EEG on 2/4. Explained that Dr. Kong Titus needs these EEG tests done to see what is going on in order to properly create a safe effective treatment plan and especially a safe drug treatment plan. Stated that Dr. Kong Titus would get this information and if there is anything else we can do to let us know, but if things get worse go to ED. Patient stated understanding and still on current Topamax dose as stated in office notes from last visit. Please advise if possible.

## 2022-02-02 NOTE — TELEPHONE ENCOUNTER
Yes I agree if these are becoming more frequent she needs to go to the hospital and I would advise either [facility], or [facility] given their capability for continuous EEG monitoring since as if she is having so many spells they would be able to capture them on EEG and characterize them as epileptic or nonepileptic seizures and develop a proper treatment plan.

## 2022-02-23 ENCOUNTER — TELEPHONE (OUTPATIENT)
Dept: NEUROLOGY | Age: 22
End: 2022-02-23

## 2022-02-23 NOTE — LETTER
[facility]  [address] [place] [address]  Phone: [phone]  Fax: [phone] Thomas DO        March 2, 2022     Patient: Lindsey Reyes   YOB: 2000   Date of Visit: 2/23/2022       To Whom It May Concern: It is my medical opinion that Tae Padilla may be in a healthcare setting for clinicals. She is typically having spells that last     If you have any questions or concerns, please don't hesitate to call.     Sincerely,        Sathish Salcido DO

## 2022-02-23 NOTE — TELEPHONE ENCOUNTER
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 She states she needs it to specify severity,length of episodes, frequency of episodes. Early signs/symptoms that an episode is coming on. Ability to participate in a healthcare setting,and what accommodations are recommended for her to be able to participate in clinicals. Please advise if this is something we are able to provide for this pt or not? Pt states she is typically having 2 episodes weekly that are lasting 2-5 minutes. Early warning signs are her hands and head begin to shake and she feels being able to excuse herself from the task she is doing and let the episode happen would be acceptable way to handle them when they do happen. She states after the episodes end she is able to go back to normal activities and does not feel sick or tired.

## 2022-02-25 NOTE — TELEPHONE ENCOUNTER
The patient can help us describe the severity, length of episodes, and frequency of episodes as detailed here and in my note. She has provided us with early signs and symptoms. Unfortunately, the difficult part is the \"ability to participate in the healthcare setting\". Certainly, for the 2 to 5 minutes of a spell her \"ability to participate in healthcare setting\" is not possible. Unfortunately, and nursing, sometimes for the care of our patients we cannot excuse ourselves because the patient could suffer life-threatening health consequences in our absence. If there is some sort of accommodation that she can have somebody cover for her if she is caring for a patient during the spells then I think that would be a way to help mitigate any damaging effects of patients. We are in the early portions of the investigation period regarding the spells so I help in time they will improve but we have to characterize but in fact we are dealing with. Continuing to follow with psychiatry for optimization of her mental health is important. If she does not yet have a therapist discussing this with psychiatry may also prove useful to further aid in strengthening her mental health as the spells may not be a manifestation of seizure but rather a stress response so we have to cover all of our bases.

## 2022-03-07 ENCOUNTER — HOSPITAL ENCOUNTER (EMERGENCY)
Age: 22
Discharge: HOME | End: 2022-03-07
Attending: EMERGENCY MEDICINE
Payer: COMMERCIAL

## 2022-03-07 VITALS
RESPIRATION RATE: 15 BRPM | DIASTOLIC BLOOD PRESSURE: 67 MMHG | SYSTOLIC BLOOD PRESSURE: 125 MMHG | OXYGEN SATURATION: 99 % | TEMPERATURE: 97.7 F | HEART RATE: 65 BPM

## 2022-03-07 DIAGNOSIS — R56.9 SEIZURE-LIKE ACTIVITY (HCC): Primary | ICD-10-CM

## 2022-03-07 PROCEDURE — 99283 EMERGENCY DEPT VISIT LOW MDM: CPT

## 2022-03-07 ASSESSMENT — ENCOUNTER SYMPTOMS
RHINORRHEA: 0
NAUSEA: 0
ABDOMINAL PAIN: 0
EYE REDNESS: 0
SORE THROAT: 0
BACK PAIN: 0
COUGH: 0
VOMITING: 0
SHORTNESS OF BREATH: 0
DIARRHEA: 0
CONSTIPATION: 0

## 2022-03-07 NOTE — ED PROVIDER NOTES
Triage Chief Complaint:   Seizures    [place]:  Lindsey Reyes is a 19 y.o. female that presents with seizure-like episode. Patient states she felt shaky and like her head was twitchy and then she began to have the episode. The episode lasted for about 60 seconds and was witnessed here in the emergency department. She states she has had multiple episodes since January 27 and has been evaluated here as well as [place]. She states she feels exhausted now that the episode is over. She is able to immediately wake up and answer questions appropriately after the seizure. No nausea or vomiting. No numbness, tingling or focal weakness. She does have a mild headache which is similar. She thought the seizures may be stress-induced. She has not missed any of her medications including her lithium. She did have an EEG done at [facility] which was normal and did catch 1 of these episodes while the EEG was in process. ROS:   Review of Systems   Constitutional: Negative for chills and fever. HENT: Negative for congestion, rhinorrhea and sore throat. Eyes: Negative for redness and visual disturbance. Respiratory: Negative for cough and shortness of breath. Cardiovascular: Negative for chest pain and leg swelling. Gastrointestinal: Negative for abdominal pain, constipation, diarrhea, nausea and vomiting. Genitourinary: Negative for dysuria and frequency. Musculoskeletal: Negative for arthralgias and back pain. Skin: Negative for rash and wound. Neurological: Positive for seizures (seizure-like activity) and headaches (mild). Negative for dizziness, syncope, weakness, light-headedness and numbness. Psychiatric/Behavioral: Negative. Negative for hallucinations and suicidal ideas.        Past Medical History:   Diagnosis Date    Bipolar 1 disorder ([facility] [address]     Epistaxis     Irregular menses     Irritable bowel syndrome     Migraines     Obesity     Seasonal allergies      Past Surgical History: Procedure Laterality Date    CHOLECYSTECTOMY       Family History   Problem Relation Age of Onset    Ovarian Cancer Paternal Grandmother     Cervical Cancer Paternal Grandmother     Diabetes Maternal Grandmother     Diabetes Father     Hypertension Father     Epilepsy Paternal Aunt     Epilepsy Paternal Grandfather     Epilepsy Paternal Cousin      Social History     Socioeconomic History    Marital status: Single     Spouse name: Not on file    Number of children: Not on file    Years of education: Not on file    Highest education level: Not on file   Occupational History    Not on file   Tobacco Use    Smoking status: Never Smoker    Smokeless tobacco: Never Used   Vaping Use    Vaping Use: Never used   Substance and Sexual Activity    Alcohol use: Not Currently    Drug use: Never    Sexual activity: Not on file   Other Topics Concern    Not on file   Social History Narrative    Not on file     Social Determinants of Health     Financial Resource Strain:     Difficulty of Paying Living Expenses: Not on file   Food Insecurity:     Worried About [address] in the Last Year: Not on file    Reese of Food in the Last Year: Not on file   Transportation Needs:     Lack of Transportation (Medical): Not on file    Lack of Transportation (Non-Medical):  Not on file   Physical Activity:     Days of Exercise per Week: Not on file    Minutes of Exercise per Session: Not on file   Stress:     Feeling of Stress : Not on file   Social Connections:     Frequency of Communication with Friends and Family: Not on file    Frequency of Social Gatherings with Friends and Family: Not on file    Attends Voodoo Services: Not on file    Active Member of Clubs or Organizations: Not on file    Attends Club or Organization Meetings: Not on file    Marital Status: Not on file   Intimate Partner Violence:     Fear of Current or Ex-Partner: Not on file    Emotionally Abused: Not on file   Jeane Physically Abused: Not on file    Sexually Abused: Not on file   Housing Stability:     Unable to Pay for Housing in the Last Year: Not on file    Number of Places Lived in the Last Year: Not on file    Unstable Housing in the Last Year: Not on file     No current facility-administered medications for this encounter. Current Outpatient Medications   Medication Sig Dispense Refill    norgestimate-ethinyl estradiol (TANA) 0.25-35 MG-MCG per tablet Take 1 tablet by mouth daily      albuterol sulfate  (90 Base) MCG/ACT inhaler Inhale 2 puffs into the lungs every 6 hours as needed      hydrOXYzine (VISTARIL) 25 MG capsule Take 25 mg by mouth nightly      lithium (ESKALITH) 450 MG extended release tablet Take 450 mg by mouth 2 times daily Takes in the afternoon and at HS      risperiDONE (RISPERDAL) 1 MG tablet Take 1 mg by mouth nightly       colestipol (COLESTID) 1 g tablet Take 2 g by mouth 2 times daily       lithium (ESKALITH) 450 MG extended release tablet Take 600 mg by mouth every morning Takes a  mg with the  to equal 600 mg q am.      topiramate (TOPAMAX) 50 MG tablet 50 mg 2 times daily       ferrous sulfate (IRON SLOW RELEASE) 142 (45 Fe) MG extended release tablet Take 142 mg by mouth daily OTC       Allergies   Allergen Reactions    Sulfa Antibiotics Hives and Swelling       Nursing Notes Reviewed     Physical Exam:   ED Triage Vitals [03/07/22 1611]   Enc Vitals Group      /70      Pulse 87      Resp 18      Temp       Temp Source Oral      SpO2 98 %      Weight       Height       Head Circumference       Peak Flow       Pain Score       Pain Loc       Pain Edu? Excl. in [address]? /67   Pulse 65   Temp 97.7 °F (36.5 °C) (Oral)   Resp 15   SpO2 99%   My pulse ox interpretation is - normal  Physical Exam  Vitals and nursing note reviewed. Constitutional:       General: She is not in acute distress. Appearance: Normal appearance.  She is well-developed. She is not toxic-appearing or diaphoretic. Comments: Appears uncomfortable     HENT:      Head: Normocephalic and atraumatic. Eyes:      General:         Right eye: No discharge. Left eye: No discharge. Extraocular Movements: Extraocular movements intact. Conjunctiva/sclera: Conjunctivae normal.      Pupils: Pupils are equal, round, and reactive to light. Neck:      Meningeal: Brudzinski's sign and Kernig's sign absent. Cardiovascular:      Rate and Rhythm: Normal rate and regular rhythm. Pulmonary:      Effort: Pulmonary effort is normal. No respiratory distress. Breath sounds: Normal breath sounds. Abdominal:      General: There is no distension. Palpations: Abdomen is soft. Tenderness: There is no abdominal tenderness. Musculoskeletal:         General: No swelling or signs of injury. Normal range of motion. Cervical back: Normal range of motion. No spinous process tenderness or muscular tenderness. Skin:     General: Skin is warm and dry. Neurological:      General: No focal deficit present. Mental Status: She is alert and oriented to person, place, and time. Cranial Nerves: No cranial nerve deficit. Sensory: No sensory deficit. Motor: No weakness. Coordination: Coordination normal.      Gait: Gait normal.   Psychiatric:         Mood and Affect: Mood normal.         Behavior: Behavior normal.         I have reviewed and interpreted all of the currently available lab results from this visit (if applicable):  No results found for this visit on 03/07/22.    Radiographs (if obtained):  [] The following radiograph was interpreted by myself in the absence of a radiologist:  [x]Radiologist's Report Reviewed:  No orders to display         EKG (if obtained): (All EKG's are interpreted by myself in the absence of a cardiologist)    MDM:  Differential diagnoses considered include but are not limited to nonepileptic spell, new onset epilepsy, malingering. Patient had this episode here in the emergency department. I did witness it. She was shaking her arms and slightly her head but did not have any significant shaking in her trunk or lower body. I was able to move patient's arms and she reacted when I open her eyes making her episode consistent with a nonepileptic spell. I do not suspect a true seizure. This is consistent with patient's previous evaluations. Patient did wake up immediately and has no focal neurologic deficits on exam.  I discussion with patient and her family they do not want any further testing here would like to follow-up with her physicians. He states she has been taking all of her medications as prescribed and she has had her lithium level checked during the recent evaluation for this. I encouraged her to follow-up with both her neurologist as well as her psychiatrist and explained the importance of follow-up with psychiatry as I suspect that is where she will get the most appropriate treatment for these issues. I also recommended that she not drive until she is cleared to drive by her neurologist.    Plan of care explained to patient. Concerning signs and symptoms warranting a return visit to the Emergency Department were explained in detail. All questions and concerns were addressed to the patient's satisfaction. Patient understood and agreed with plan. I did don appropriate PPE (including face mask, protective eye ware/safety glasses and gloves), as recommended by the health facility/national standard best practice, during my bedside interactions with the patient. The likelihood of other entities in the differential is insufficient to justify any further testing for them. This was explained to the patient. The patient was advised that persistent or worsening symptoms would requirefurther evaluation. Clinical Impression:  1.  Seizure-like activity ([facility] [address]          Salomon Allen MD       Please note that portions of this note may have been complete with a voice recognition program.  Effortswere made to edit the dictations, but occasional words are mis-transcribed.           Melisa Van MD  03/07/22 3333

## 2022-03-10 NOTE — TELEPHONE ENCOUNTER
Please place the following on a letterhead:    Lesli Richey is currently experiencing spells which we are obtaining diagnostic testing to help determine the etiology. Early signs that she is about to have a spell are characterized by her hands and head beginning to shake which alerted her to know to remove her from the task she is doing as this is predictive than an episode is about to happen. The spells are typically lasting between 2 to 5 minutes. She states that after the activities she is able to go back to her normal activities and does not feel sick or tired. Presently she is having about 2 episodes weekly. Overall, cognitively and with her ability to learn in the clinical setting I do not feel that the spells are interfering with the educational standpoint. From a patient care standpoint, if she is caring for patient, sometimes we cannot excuse ourselves because the patient could suffer life-threatening health consequences in her absence. 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

## 2022-03-10 NOTE — TELEPHONE ENCOUNTER
Placed note on letterhead. Called pt to let her know letter is ready to be picked up or if we need to fax somewhere to let us know the number.

## 2022-09-08 ENCOUNTER — HOSPITAL ENCOUNTER (EMERGENCY)
Age: 22
Discharge: HOME | End: 2022-09-08
Attending: EMERGENCY MEDICINE

## 2022-09-08 VITALS
HEART RATE: 79 BPM | TEMPERATURE: 98.3 F | DIASTOLIC BLOOD PRESSURE: 76 MMHG | RESPIRATION RATE: 18 BRPM | OXYGEN SATURATION: 96 % | WEIGHT: 240 LBS | SYSTOLIC BLOOD PRESSURE: 142 MMHG | BODY MASS INDEX: 39.99 KG/M2 | HEIGHT: 65 IN

## 2022-09-08 DIAGNOSIS — R56.9 SEIZURE-LIKE ACTIVITY (HCC): Primary | ICD-10-CM

## 2022-09-08 LAB
ALBUMIN SERPL-MCNC: 5.1 GM/DL (ref 3.4–5)
ALP BLD-CCNC: 64 IU/L (ref 40–129)
ALT SERPL-CCNC: 46 U/L (ref 10–40)
ANION GAP SERPL CALCULATED.3IONS-SCNC: 11 MMOL/L (ref 4–16)
AST SERPL-CCNC: 32 IU/L (ref 15–37)
BASOPHILS ABSOLUTE: 0.1 K/CU MM
BASOPHILS RELATIVE PERCENT: 0.8 % (ref 0–1)
BILIRUB SERPL-MCNC: 0.9 MG/DL (ref 0–1)
BUN BLDV-MCNC: 8 MG/DL (ref 6–23)
CALCIUM SERPL-MCNC: 9.7 MG/DL (ref 8.3–10.6)
CHLORIDE BLD-SCNC: 100 MMOL/L (ref 99–110)
CO2: 24 MMOL/L (ref 21–32)
CREAT SERPL-MCNC: 0.8 MG/DL (ref 0.6–1.1)
DIFFERENTIAL TYPE: ABNORMAL
EOSINOPHILS ABSOLUTE: 0.3 K/CU MM
EOSINOPHILS RELATIVE PERCENT: 2.6 % (ref 0–3)
GFR AFRICAN AMERICAN: >60 ML/MIN/1.73M2
GFR NON-AFRICAN AMERICAN: >60 ML/MIN/1.73M2
GLUCOSE BLD-MCNC: 98 MG/DL (ref 70–99)
HCT VFR BLD CALC: 42.4 % (ref 37–47)
HEMOGLOBIN: 14.1 GM/DL (ref 12.5–16)
IMMATURE NEUTROPHIL %: 0.7 % (ref 0–0.43)
LITHIUM LEVEL: 0.9 MMOL/L (ref 0.6–1.2)
LYMPHOCYTES ABSOLUTE: 2.3 K/CU MM
LYMPHOCYTES RELATIVE PERCENT: 24.2 % (ref 24–44)
MCH RBC QN AUTO: 32.3 PG (ref 27–31)
MCHC RBC AUTO-ENTMCNC: 33.3 % (ref 32–36)
MCV RBC AUTO: 97.2 FL (ref 78–100)
MONOCYTES ABSOLUTE: 0.7 K/CU MM
MONOCYTES RELATIVE PERCENT: 6.9 % (ref 0–4)
NUCLEATED RBC %: 0 %
PDW BLD-RTO: 11.7 % (ref 11.7–14.9)
PLATELET # BLD: 339 K/CU MM (ref 140–440)
PMV BLD AUTO: 10 FL (ref 7.5–11.1)
POTASSIUM SERPL-SCNC: 3.9 MMOL/L (ref 3.5–5.1)
RBC # BLD: 4.36 M/CU MM (ref 4.2–5.4)
SEGMENTED NEUTROPHILS ABSOLUTE COUNT: 6.2 K/CU MM
SEGMENTED NEUTROPHILS RELATIVE PERCENT: 64.8 % (ref 36–66)
SODIUM BLD-SCNC: 135 MMOL/L (ref 135–145)
TOTAL IMMATURE NEUTOROPHIL: 0.07 K/CU MM
TOTAL NUCLEATED RBC: 0 K/CU MM
TOTAL PROTEIN: 7.6 GM/DL (ref 6.4–8.2)
WBC # BLD: 9.6 K/CU MM (ref 4–10.5)

## 2022-09-08 PROCEDURE — 99284 EMERGENCY DEPT VISIT MOD MDM: CPT

## 2022-09-08 PROCEDURE — 80053 COMPREHEN METABOLIC PANEL: CPT

## 2022-09-08 PROCEDURE — 96374 THER/PROPH/DIAG INJ IV PUSH: CPT

## 2022-09-08 PROCEDURE — 6360000002 HC RX W HCPCS: Performed by: EMERGENCY MEDICINE

## 2022-09-08 PROCEDURE — 85025 COMPLETE CBC W/AUTO DIFF WBC: CPT

## 2022-09-08 PROCEDURE — 80178 ASSAY OF LITHIUM: CPT

## 2022-09-08 RX ORDER — ONDANSETRON 2 MG/ML
4 INJECTION INTRAMUSCULAR; INTRAVENOUS EVERY 30 MIN PRN
Status: DISCONTINUED | OUTPATIENT
Start: 2022-09-08 | End: 2022-09-08 | Stop reason: HOSPADM

## 2022-09-08 RX ADMIN — ONDANSETRON 4 MG: 2 INJECTION INTRAMUSCULAR; INTRAVENOUS at 13:52

## 2022-09-08 ASSESSMENT — PAIN SCALES - GENERAL: PAINLEVEL_OUTOF10: 0

## 2022-09-08 ASSESSMENT — PAIN - FUNCTIONAL ASSESSMENT: PAIN_FUNCTIONAL_ASSESSMENT: 0-10

## 2022-09-08 NOTE — DISCHARGE INSTRUCTIONS
[address] for Salvatore Quinonez and [address] [facility] [address] a comprehensive array of mental health counseling and psychiatric services to adults, youth and children, including inpatient hospitalization. Limited primary health care is available to eligible clients. Provides alcohol and drug treatment services for adolescents. Adult Services  [address]  [place], [address] [facility]  [phone]  www. [facility]  [address], [address]  [address], [facility]  [facility]  [address] [place], [address]  [address] [facility]  [address] [place] [address], [address] [facility]  [phone]  www.[facility]. org  [email or web address]. [email or web address]    [facility] is a grass roots education support and advocacy organization founded in 1997. The mission is to offer hope to all affected by mental illness. They offer educational programs, support groups and phone support. They also advocate for better services, legislative changes, and increased research on mental illness. [facility] maintains the [facility] located in [facility] at [address] [place] [address]. It is open daily on weekdays from 8:30AM to 3:00PM and serves breakfast and lunch daily. The drop-in center is a safe and confidential places where adults with mental illness can socialize and participate in activities. [facility] support groups are held regularly throughout the community. Kim Patel for [address] [facility] [address], [address]  [phone]  www. [facility]. org    Serves as a mental health treatment center for emotionally, behaviorally, and psychologically troubled children and youth.     Residential Intensive Therapy  Severely emotionally troubled youth, ages 14-23, receiving intensive mental health therapy and living in 10-bedroom cottages on the [address] [place] [address], for stays of 6 to 12 months. Treatment [facility], ages birth to 25, occasionally older, needing out of home placement, and typically presenting with troubling behaviors requiring some level of therapeutic intervention. Family and Adolescent Complex Comprehensive Treatment  State of the art Residential Respite program that combines a sensory controlled environment with comprehensive care. [facility], [facility].  Outpatient counseling center for youth and families, offering individual, group, and family behavioral health counseling, in multiple programs, on the [address] [place] [address] and on-site in [facility] schools. Intensive Home-Based Therapy  Home-based services providing intensive intervention into unacceptable behaviors of youth and families, which are necessary to prevent the possible imminent decision to remove a child from the home for out-of-home placement. Transitional Independent Living  Serves 16and 25year old youth transitioning from foster care or other placement programs to living independently as young adults. [facility]  A strength-based model of in-home intervention services, planned by family members and case coordinators, to preserve an intact family when moderate issues of neglect or abuse have been discovered, which likely could result in removing the child from the home, if not resolved. Respite and [address] program operated by multiple agencies in the three [facility] to provide an emergency short-term drop off center, for stays up to two weeks, for youth and young adults where a crisis exists in the home, and temporary respite is necessary to evaluate the situation and to determine potentially more permanent solutions for stability in the home.         [address]   [phone]   www. [email or web address].org    Provides outpatient mental health counseling and food bank services. [facility]  [address] [place]   Shane Ayers, [address] [facility]   [phone]   www. [facility].[employer]    Builds youth resiliency through evidence-based programing. Services include special education program development and supervision, financial planning and analysis for districts, psychologists and other related service providers, multi-handicapped and  classes, drug free program coordination, OSHA and asbestos regulation compliance assistance and an attendance officer. [address] [facility]   [address] [facility]   [address] [facility], [address] [place] [address]   [phone]   www. [facility].[employer]    Coordinates early access to a variety of services to children and families through collaboration of community partners. [address] [facility] 505, [address] [facility]   [phone]   www.[facility]. [employer]    Provides children and their families with access to developmental, physical health, behavioral health and family treatment and support services. [employer]  [address] [place] Mireya, [address] [place] [address]  [phone]  www.[employer]. org    Provides prevention and peer support for seniors to promote general well-being, independent living and socialization as well as referrals for treatment services. Programming is diverse , including exercise, social events, travel, art classes, and computer labs, to home delivered meals, transportation, homemaker assistance, personal care, tax preparation and medical claims. John Santiago 41  [address] [facility]   Shane Ayers [address]   [phone]   www. .[employer]. [place].    Provides after-school and prevention activities at [facility] for children ages 11 to 16. [address] [place] [address]   [phone]   www.[facility]. [facility]    Provides outpatient mental health counseling services for children and adults. For additional information and referrals, dial 2-1-1. Residents can obtain information at 2-1-1 about hospitals, doctors, the nearest food pantry, utility services, or a variety of other types of resources. The number is available for non-emergency assistance 24 hours a day.   Alternate Numbers:    [facility]:  [phone]    [facility]:  [phone]    [facility]:  [phone]    Toll-Free:  [phone]

## 2022-09-14 NOTE — ED PROVIDER NOTES
Emergency Department Encounter  Location: [address] [place] [address]    Patient: Анна Wu  MRN: [id number]  : 2000  Date of evaluation: 2022  ED Provider: Nieves Traore DO    Chief Complaint:    Seizures (PT at work and started to shake. Seizure was witnessed and they stated it was approx. 2 minutes in length. PT did not fall.)    [place]:  Анна Wu is a 23 y.o. female that presents to the emergency department from upstairs in the hospital. Patient was working her shift today. She recalls sitting down in her chair. Per bystanders, patient then began shaking. Staff was able to hold her upright in her chair and then assist her to a bed. No reported tongue biting or urinary incontinence. Patient denies recent illness. No HA or current vision changes. ROS:  At least 10 systems reviewed and are acutely negative unless otherwise noted in the HPI.     Past Medical History:   Diagnosis Date    Bipolar 1 disorder (HCC)     Epistaxis     Irregular menses     Irritable bowel syndrome     Migraines     Obesity     Seasonal allergies     Seizures (HCC)      Past Surgical History:   Procedure Laterality Date    CHOLECYSTECTOMY       Family History   Problem Relation Age of Onset    Ovarian Cancer Paternal Grandmother     Cervical Cancer Paternal Grandmother     Diabetes Maternal Grandmother     Diabetes Father     Hypertension Father     Epilepsy Paternal Aunt     Epilepsy Paternal Grandfather     Epilepsy Paternal Cousin      Social History     Socioeconomic History    Marital status: Single     Spouse name: Not on file    Number of children: Not on file    Years of education: Not on file    Highest education level: Not on file   Occupational History    Not on file   Tobacco Use    Smoking status: Never    Smokeless tobacco: Never   Vaping Use    Vaping Use: Never used   Substance and Sexual Activity    Alcohol use: Not Currently    Drug use: Never    Sexual activity: Not on file   Other Topics Concern    Not on file   Social History Narrative    Not on file     Social Determinants of Health     Financial Resource Strain: Not on file   Food Insecurity: Not on file   Transportation Needs: Not on file   Physical Activity: Not on file   Stress: Not on file   Social Connections: Not on file   Intimate Partner Violence: Not on file   Housing Stability: Not on file     No current facility-administered medications for this encounter. Current Outpatient Medications   Medication Sig Dispense Refill    norgestimate-ethinyl estradiol (TANA) 0.25-35 MG-MCG per tablet Take 1 tablet by mouth daily      albuterol sulfate  (90 Base) MCG/ACT inhaler Inhale 2 puffs into the lungs every 6 hours as needed      hydrOXYzine (VISTARIL) 25 MG capsule Take 25 mg by mouth nightly      lithium (ESKALITH) 450 MG extended release tablet Take 450 mg by mouth 2 times daily Takes in the afternoon and at HS      risperiDONE (RISPERDAL) 1 MG tablet Take 1 mg by mouth nightly       colestipol (COLESTID) 1 g tablet Take 2 g by mouth 2 times daily       lithium (ESKALITH) 450 MG extended release tablet Take 600 mg by mouth every morning Takes a  mg with the  to equal 600 mg q am.      topiramate (TOPAMAX) 50 MG tablet 50 mg 2 times daily  (Patient not taking: Reported on 9/8/2022)      ferrous sulfate (IRON SLOW RELEASE) 142 (45 Fe) MG extended release tablet Take 142 mg by mouth daily OTC       Allergies   Allergen Reactions    Sulfa Antibiotics Hives and Swelling       Nursing Notes Reviewed    Physical Exam:  ED Triage Vitals [09/08/22 1314]   Enc Vitals Group      BP (!) 142/76      Heart Rate 87      Resp 17      Temp 98.3 °F (36.8 °C)      Temp Source Oral      SpO2 99 %      Weight 240 lb (108.9 kg)      Height 5' 5\" (1.651 m)      Head Circumference       Peak Flow       Pain Score       Pain Loc       Pain Edu? Excl. in [address]? GENERAL APPEARANCE: Awake but quiet. Cooperative. No acute distress. HEAD: Normocephalic. Atraumatic. EYES: EOM's grossly intact. Sclera anicteric. ENT: Tolerates saliva. No trismus. NECK: Supple. Trachea midline. CARDIO: RRR. Radial pulse 2+. LUNGS: Respirations unlabored. CTAB. ABDOMEN: Soft. Non-distended. Non-tender. EXTREMITIES: No acute deformities. SKIN: Warm and dry. NEUROLOGICAL: Patient is alert and oriented with clear speech and mentation. CN 2-12 are grossly intact. Symmetric motor strength and intact sensation in all extremities. PSYCHIATRIC: Normal mood.      Labs:  Results for orders placed or performed during the hospital encounter of 09/08/22   CBC with Auto Differential   Result Value Ref Range    WBC 9.6 4.0 - 10.5 K/CU MM    RBC 4.36 4.2 - 5.4 M/CU MM    Hemoglobin 14.1 12.5 - 16.0 GM/DL    Hematocrit 42.4 37 - 47 %    MCV 97.2 78 - 100 FL    MCH 32.3 (H) 27 - 31 PG    MCHC 33.3 32.0 - 36.0 %    RDW 11.7 11.7 - 14.9 %    Platelets 087 372 - 655 K/CU MM    MPV 10.0 7.5 - 11.1 FL    Differential Type AUTOMATED DIFFERENTIAL     Segs Relative 64.8 36 - 66 %    Lymphocytes % 24.2 24 - 44 %    Monocytes % 6.9 (H) 0 - 4 %    Eosinophils % 2.6 0 - 3 %    Basophils % 0.8 0 - 1 %    Segs Absolute 6.2 K/CU MM    Lymphocytes Absolute 2.3 K/CU MM    Monocytes Absolute 0.7 K/CU MM    Eosinophils Absolute 0.3 K/CU MM    Basophils Absolute 0.1 K/CU MM    Nucleated RBC % 0.0 %    Total Nucleated RBC 0.0 K/CU MM    Total Immature Neutrophil 0.07 K/CU MM    Immature Neutrophil % 0.7 (H) 0 - 0.43 %   Comprehensive Metabolic Panel w/ Reflex to MG   Result Value Ref Range    Sodium 135 135 - 145 MMOL/L    Potassium 3.9 3.5 - 5.1 MMOL/L    Chloride 100 99 - 110 mMol/L    CO2 24 21 - 32 MMOL/L    BUN 8 6 - 23 MG/DL    Creatinine 0.8 0.6 - 1.1 MG/DL    Glucose 98 70 - 99 MG/DL    Calcium 9.7 8.3 - 10.6 MG/DL    Albumin 5.1 (H) 3.4 - 5.0 GM/DL    Total Protein 7.6 6.4 - 8.2 GM/DL    Total Bilirubin 0.9 0.0 - 1.0 MG/DL    ALT 46 (H) 10 - 40 U/L    AST 32 15 - 37 IU/L    Alkaline Phosphatase 64 40 - 129 IU/L    GFR Non-African American >60 >60 mL/min/1.73m2    GFR African American >60 >60 mL/min/1.73m2    Anion Gap 11 4 - 16   Lithium Level   Result Value Ref Range    Lithium Lvl 0.9 0.6 - 1.2 MMOL/L         ED Course and MDM:  Patient arrives to ED with hospitalist who responded to rapid response. No further seizure activity on arrival to ED. Patient neuro intact. Labs are reviewed and are reassuring. Patient states that she has had similar episodes since spring of this year. Was evaluated at OSH and found to have nonepileptic events. EEG from 2/4/22 reviewed. States her episodes are triggered by stress, but has been working on decreasing this. Has been seeing a therapist.    She is now comfortable, calm, neuro intact. I think patient is appropriate for outpatient management. Patient is given instructions regarding symptomatic care at home as well as return precautions. To call PCP for follow up in 2-3 days. Patient verbalizes understanding of all instructions and is comfortable with the plan of care. Final Impression:  1.  Seizure-like activity [facility])      DISPOSITION Decision To Discharge 09/08/2022 03:17:35 PM      Patient referred to:  Christian Boyle DO  [address]  [address]  [phone]    Schedule an appointment as soon as possible for a visit in 2 days      Karen Garcia DO  [address] [place] [address] Emergency Department  [place] [id number]  [phone]    If symptoms worsen    Discharge medications:  Discharge Medication List as of 9/8/2022  3:21 PM        (Please note that portions of this note may have been completed with a voice recognition program. Efforts were made to edit the dictations but occasionally words are mis-transcribed.)    Doretha Closs, DO Doretha Closs, DO  09/13/22 2313

## 2022-12-06 ENCOUNTER — OFFICE VISIT (OUTPATIENT)
Dept: OBGYN | Age: 22
End: 2022-12-06
Payer: COMMERCIAL

## 2022-12-06 VITALS
DIASTOLIC BLOOD PRESSURE: 83 MMHG | HEIGHT: 65 IN | SYSTOLIC BLOOD PRESSURE: 123 MMHG | BODY MASS INDEX: 38.82 KG/M2 | WEIGHT: 233 LBS

## 2022-12-06 DIAGNOSIS — R39.15 URINARY URGENCY: Primary | ICD-10-CM

## 2022-12-06 DIAGNOSIS — N93.0 BLEEDING AFTER INTERCOURSE: ICD-10-CM

## 2022-12-06 DIAGNOSIS — R35.0 URINARY FREQUENCY: ICD-10-CM

## 2022-12-06 PROCEDURE — 99213 OFFICE O/P EST LOW 20 MIN: CPT | Performed by: NURSE PRACTITIONER

## 2022-12-06 SDOH — ECONOMIC STABILITY: FOOD INSECURITY: WITHIN THE PAST 12 MONTHS, YOU WORRIED THAT YOUR FOOD WOULD RUN OUT BEFORE YOU GOT MONEY TO BUY MORE.: NEVER TRUE

## 2022-12-06 SDOH — ECONOMIC STABILITY: TRANSPORTATION INSECURITY
IN THE PAST 12 MONTHS, HAS LACK OF TRANSPORTATION KEPT YOU FROM MEETINGS, WORK, OR FROM GETTING THINGS NEEDED FOR DAILY LIVING?: NO

## 2022-12-06 SDOH — ECONOMIC STABILITY: FOOD INSECURITY: WITHIN THE PAST 12 MONTHS, THE FOOD YOU BOUGHT JUST DIDN'T LAST AND YOU DIDN'T HAVE MONEY TO GET MORE.: NEVER TRUE

## 2022-12-06 SDOH — ECONOMIC STABILITY: INCOME INSECURITY: HOW HARD IS IT FOR YOU TO PAY FOR THE VERY BASICS LIKE FOOD, HOUSING, MEDICAL CARE, AND HEATING?: NOT HARD AT ALL

## 2022-12-06 SDOH — ECONOMIC STABILITY: HOUSING INSECURITY
IN THE LAST 12 MONTHS, WAS THERE A TIME WHEN YOU DID NOT HAVE A STEADY PLACE TO SLEEP OR SLEPT IN A SHELTER (INCLUDING NOW)?: NO

## 2022-12-06 SDOH — ECONOMIC STABILITY: INCOME INSECURITY: IN THE LAST 12 MONTHS, WAS THERE A TIME WHEN YOU WERE NOT ABLE TO PAY THE MORTGAGE OR RENT ON TIME?: NO

## 2022-12-06 SDOH — ECONOMIC STABILITY: TRANSPORTATION INSECURITY
IN THE PAST 12 MONTHS, HAS THE LACK OF TRANSPORTATION KEPT YOU FROM MEDICAL APPOINTMENTS OR FROM GETTING MEDICATIONS?: NO

## 2022-12-06 ASSESSMENT — ENCOUNTER SYMPTOMS
NAUSEA: 0
ABDOMINAL PAIN: 0
DIARRHEA: 0
RESPIRATORY NEGATIVE: 1
CONSTIPATION: 0
GASTROINTESTINAL NEGATIVE: 1
SHORTNESS OF BREATH: 0
VOMITING: 0

## 2022-12-06 ASSESSMENT — PATIENT HEALTH QUESTIONNAIRE - PHQ9
SUM OF ALL RESPONSES TO PHQ QUESTIONS 1-9: 2
1. LITTLE INTEREST OR PLEASURE IN DOING THINGS: 1
2. FEELING DOWN, DEPRESSED OR HOPELESS: 1
SUM OF ALL RESPONSES TO PHQ QUESTIONS 1-9: 2
SUM OF ALL RESPONSES TO PHQ9 QUESTIONS 1 & 2: 2

## 2022-12-06 NOTE — PROGRESS NOTES
12/6/22    Cici Banks  2000    Chief Complaint   Patient presents with    Other     Pt c/o bleeding after intercourse x 6 months, moderate-large amount. Also c/o urinary urgency. Requesting refill on ocp. Joni Donaldson is a 23 y.o. female who presents today for evaluation of see above.      Past Medical History:   Diagnosis Date    Bipolar 1 disorder ([address]     Bleeding after intercourse     Epistaxis     Irregular menses     Irritable bowel syndrome     Migraines     Obesity     Seasonal allergies     Seizures (HCC)     Urinary urgency        Past Surgical History:   Procedure Laterality Date    CHOLECYSTECTOMY         Social History     Tobacco Use    Smoking status: Never    Smokeless tobacco: Never   Vaping Use    Vaping Use: Never used   Substance Use Topics    Alcohol use: Not Currently    Drug use: Never       Family History   Problem Relation Age of Onset    Ovarian Cancer Paternal Grandmother     Cervical Cancer Paternal Grandmother     Diabetes Maternal Grandmother     Diabetes Father     Hypertension Father     Epilepsy Paternal Aunt     Epilepsy Paternal Grandfather     Epilepsy Paternal Cousin        Current Outpatient Medications   Medication Sig Dispense Refill    norgestimate-ethinyl estradiol (ORTHO-CYCLEN) 0.25-35 MG-MCG per tablet Take 1 tablet by mouth daily      albuterol sulfate  (90 Base) MCG/ACT inhaler Inhale 2 puffs into the lungs every 6 hours as needed      hydrOXYzine (VISTARIL) 25 MG capsule Take 25 mg by mouth nightly      lithium (ESKALITH) 450 MG extended release tablet Take 450 mg by mouth 2 times daily Takes in the afternoon and at HS      risperiDONE (RISPERDAL) 1 MG tablet Take 1 mg by mouth nightly       colestipol (COLESTID) 1 g tablet Take 2 g by mouth 2 times daily       lithium (ESKALITH) 450 MG extended release tablet Take 600 mg by mouth every morning Takes a  mg with the  to equal 600 mg q am.      ferrous sulfate (IRON SLOW RELEASE) 142 (45 Fe) MG extended release tablet Take 142 mg by mouth daily OTC      topiramate (TOPAMAX) 50 MG tablet 50 mg 2 times daily  (Patient not taking: No sig reported)       No current facility-administered medications for this visit. Allergies   Allergen Reactions    Sulfa Antibiotics Hives and Swelling           Immunization History   Administered Date(s) Administered    COVID-19, PFIZER PURPLE top, DILUTE for use, (age 15 y+), 30mcg/0.3mL 2021, 2021, 2022       Review of Systems   Constitutional: Negative. Negative for fatigue. Respiratory: Negative. Negative for shortness of breath. Gastrointestinal: Negative. Negative for abdominal pain, constipation, diarrhea, nausea and vomiting. Genitourinary:  Positive for frequency and urgency. Neurological:  Negative for dizziness. Psychiatric/Behavioral: Negative. /83   Ht 5' 5\" (1.651 m)   Wt 233 lb (105.7 kg)   LMP 2022   BMI 38.77 kg/m²     Physical Exam  Vitals and nursing note reviewed. Constitutional:       Appearance: Normal appearance. She is obese. HENT:      Head: Normocephalic. Pulmonary:      Effort: Pulmonary effort is normal. No respiratory distress. Abdominal:      Palpations: Abdomen is soft. Tenderness: There is no abdominal tenderness. Genitourinary:     General: Normal vulva. Exam position: Lithotomy position. Vagina: Normal.      Cervix: No cervical motion tenderness, discharge, friability, lesion or cervical bleeding. Uterus: Normal.       Adnexa: Right adnexa normal and left adnexa normal.      Rectum: Normal.   Musculoskeletal:         General: Normal range of motion. Cervical back: Normal and normal range of motion. Lumbar back: Normal.   Lymphadenopathy:      Cervical: No cervical adenopathy. Upper Body:      Right upper body: No supraclavicular or axillary adenopathy. Left upper body: No supraclavicular or axillary adenopathy. Lower Body: No right inguinal adenopathy. No left inguinal adenopathy. Skin:     General: Skin is warm and dry. Neurological:      General: No focal deficit present. Mental Status: She is alert and oriented to person, place, and time. Psychiatric:         Attention and Perception: Attention normal.         Mood and Affect: Mood normal.         Speech: Speech normal.         Behavior: Behavior is cooperative. Cognition and Memory: Cognition normal.         Judgment: Judgment normal.       No results found for this visit on 12/06/22. ASSESSMENT AND PLAN   Diagnosis Orders   1. Urinary urgency  Culture, Urine      2. Bleeding after intercourse  Vaginal Pathogens Probes *A    C.trachomatis N.gonorrhoeae DNA    US NON OB TRANSVAGINAL      3. Urinary frequency  Culture, Urine        U/s scheduled      No follow-ups on file.     jO Medina, CHIP - CNP

## 2022-12-07 LAB
CANDIDA SPECIES, DNA PROBE: NORMAL
GARDNERELLA VAGINALIS, DNA PROBE: NORMAL
TRICHOMONAS VAGINALIS DNA: NORMAL

## 2022-12-08 LAB
C TRACH DNA GENITAL QL NAA+PROBE: NEGATIVE
N. GONORRHOEAE DNA: NEGATIVE
ORGANISM: ABNORMAL
URINE CULTURE, ROUTINE: ABNORMAL

## 2022-12-08 RX ORDER — CEFTRIAXONE 500 MG/1
500 INJECTION, POWDER, FOR SOLUTION INTRAMUSCULAR; INTRAVENOUS ONCE
Qty: 1 EACH | Refills: 0
Start: 2022-12-08 | End: 2022-12-08

## 2022-12-11 RX ORDER — NORGESTIMATE AND ETHINYL ESTRADIOL 0.25-0.035
1 KIT ORAL DAILY
Qty: 1 PACKET | Refills: 11 | Status: SHIPPED | OUTPATIENT
Start: 2022-12-11

## 2022-12-13 ENCOUNTER — NURSE ONLY (OUTPATIENT)
Dept: OBGYN | Age: 22
End: 2022-12-13
Payer: COMMERCIAL

## 2022-12-13 VITALS
SYSTOLIC BLOOD PRESSURE: 116 MMHG | BODY MASS INDEX: 39.15 KG/M2 | WEIGHT: 235 LBS | DIASTOLIC BLOOD PRESSURE: 78 MMHG | HEIGHT: 65 IN | HEART RATE: 86 BPM

## 2022-12-13 DIAGNOSIS — A49.8 KLEBSIELLA INFECTION: Primary | ICD-10-CM

## 2022-12-13 PROCEDURE — 96372 THER/PROPH/DIAG INJ SC/IM: CPT | Performed by: NURSE PRACTITIONER

## 2022-12-13 RX ORDER — CEFTRIAXONE 500 MG/1
500 INJECTION, POWDER, FOR SOLUTION INTRAMUSCULAR; INTRAVENOUS ONCE
Status: COMPLETED | OUTPATIENT
Start: 2022-12-13 | End: 2022-12-13

## 2022-12-13 RX ADMIN — CEFTRIAXONE 500 MG: 500 INJECTION, POWDER, FOR SOLUTION INTRAMUSCULAR; INTRAVENOUS at 13:42

## 2022-12-30 ENCOUNTER — OFFICE VISIT (OUTPATIENT)
Dept: OBGYN | Age: 22
End: 2022-12-30
Payer: COMMERCIAL

## 2022-12-30 VITALS
BODY MASS INDEX: 39.32 KG/M2 | HEIGHT: 65 IN | DIASTOLIC BLOOD PRESSURE: 75 MMHG | WEIGHT: 236 LBS | SYSTOLIC BLOOD PRESSURE: 110 MMHG

## 2022-12-30 DIAGNOSIS — O20.0 THREATENED ABORTION: Primary | ICD-10-CM

## 2022-12-30 LAB
HCT VFR BLD CALC: 40.6 % (ref 36–48)
HEMOGLOBIN: 13.6 G/DL (ref 12–16)
MCH RBC QN AUTO: 32.4 PG (ref 26–34)
MCHC RBC AUTO-ENTMCNC: 33.5 G/DL (ref 31–36)
MCV RBC AUTO: 96.9 FL (ref 80–100)
PDW BLD-RTO: 12.5 % (ref 12.4–15.4)
PLATELET # BLD: 352 K/UL (ref 135–450)
PMV BLD AUTO: 9 FL (ref 5–10.5)
RBC # BLD: 4.19 M/UL (ref 4–5.2)
WBC # BLD: 10.1 K/UL (ref 4–11)

## 2022-12-30 PROCEDURE — 36415 COLL VENOUS BLD VENIPUNCTURE: CPT | Performed by: OBSTETRICS & GYNECOLOGY

## 2022-12-30 PROCEDURE — 99204 OFFICE O/P NEW MOD 45 MIN: CPT | Performed by: OBSTETRICS & GYNECOLOGY

## 2022-12-30 NOTE — PROGRESS NOTES
22    Cicijacob Banks  2000    Chief Complaint   Patient presents with    Follow-up     ER f/u. Pt had TAB. Pt c/o still having cramps and dark red bleeding. Ron Shah is a 23 y.o. female who presents today for evaluation of threatened . Recently seen in er x 2 with bleeding and cramping and decreased hcg. Bleeding has decreased.  Cramping has decreased    Past Medical History:   Diagnosis Date    Bipolar 1 disorder ([address]     Bleeding after intercourse     Epistaxis     Irregular menses     Irritable bowel syndrome     Migraines     Obesity     Seasonal allergies     Seizures (HCC)     Urinary urgency        Past Surgical History:   Procedure Laterality Date    CHOLECYSTECTOMY         Social History     Tobacco Use    Smoking status: Never    Smokeless tobacco: Never   Vaping Use    Vaping Use: Never used   Substance Use Topics    Alcohol use: Not Currently    Drug use: Never       Family History   Problem Relation Age of Onset    Ovarian Cancer Paternal Grandmother     Cervical Cancer Paternal Grandmother     Diabetes Maternal Grandmother     Diabetes Father     Hypertension Father     Epilepsy Paternal Aunt     Epilepsy Paternal Grandfather     Epilepsy Paternal Cousin        Current Outpatient Medications   Medication Sig Dispense Refill    norgestimate-ethinyl estradiol (ORTHO-CYCLEN) 0.25-35 MG-MCG per tablet Take 1 tablet by mouth daily 1 packet 11    albuterol sulfate  (90 Base) MCG/ACT inhaler Inhale 2 puffs into the lungs every 6 hours as needed      hydrOXYzine (VISTARIL) 25 MG capsule Take 25 mg by mouth nightly      lithium (ESKALITH) 450 MG extended release tablet Take 450 mg by mouth 2 times daily Takes in the afternoon and at HS      risperiDONE (RISPERDAL) 1 MG tablet Take 1 mg by mouth nightly       colestipol (COLESTID) 1 g tablet Take 2 g by mouth 2 times daily       lithium (ESKALITH) 450 MG extended release tablet Take 600 mg by mouth every morning Takes a  mg with the  to equal 600 mg q am.      topiramate (TOPAMAX) 50 MG tablet 50 mg 2 times daily  (Patient not taking: No sig reported)      ferrous sulfate (IRON SLOW RELEASE) 142 (45 Fe) MG extended release tablet Take 142 mg by mouth daily OTC       No current facility-administered medications for this visit. Allergies   Allergen Reactions    Sulfa Antibiotics Hives and Swelling           Immunization History   Administered Date(s) Administered    COVID-19, PFIZER PURPLE top, DILUTE for use, (age 15 y+), 30mcg/0.3mL 2021, 2021, 2022       Review of Systems    /75 (Site: Left Upper Arm, Position: Sitting, Cuff Size: Large Adult)   Ht 5' 5\" (1.651 m)   Wt 236 lb (107 kg)   LMP 2022   BMI 39.27 kg/m²     Physical Exam  Constitutional:       General: She is not in acute distress. Appearance: Normal appearance. She is not ill-appearing. HENT:      Head: Normocephalic. Nose: Nose normal.   Eyes:      General: No scleral icterus. Right eye: No discharge. Left eye: No discharge. Cardiovascular:      Pulses: Normal pulses. Pulmonary:      Effort: Pulmonary effort is normal.   Abdominal:      General: Abdomen is flat. There is no distension. Palpations: Abdomen is soft. There is no mass. Tenderness: There is no abdominal tenderness. Hernia: No hernia is present. Musculoskeletal:         General: Normal range of motion. Cervical back: Normal range of motion and neck supple. No rigidity. Skin:     General: Skin is warm and dry. Neurological:      General: No focal deficit present. Mental Status: She is alert and oriented to person, place, and time.    Psychiatric:         Mood and Affect: Mood normal.         Behavior: Behavior normal.     Specimen:  Blood - Vein specimen (specimen)   Ref Range & Units 3 d ago   WBC 4.0 - 10.5 K/uL 8.9    RBC 3.86 - 5.17 M/uL 3.95    HGB 12.1 - 15.8 g/dL 12.9    HCT 35.8 - 46.5 % 37.2    MCV 85.0 - 99.0 fl 94.2    MCH 28.4 - 33.4 pg 32.5    MCHC 31.1 - 37.0 g/dL 34.5    RDW 11.7 - 15.2 % 13.5    Platelet count 084 - 393 K/uL 334    Neutrophils % % 59.4    Lymphocytes % % 27.8    Monocytes % % 10.2    Eosinophils % % 2.4    Basophils % % 0.2    Neutrophils Absolute 2.0 - 7.3 K/uL 5.3    Lymphocytes Absolute 0.8 - 3.6 K/uL 2.5    Monocytes Absolute 0.3 - 0.9 K/uL 0.9    Eosinophils Absolute 0.0 - 0.4 K/uL 0.2    Basophils Absolute 0.0 - 0.1 K/uL 0.0    Resulting Agency  [address] [place] [address]   Specimen Collected: 12/27/22 13:56 Last Resulted: 12/27/22 14:05   Received From: [facility]  Result Received: 12/30/22 09:25    View Encounter      Recent Data from [facility] to CBC w/ Diff-Complete Blood Count  Component 12/27/22  12/25/22  05/25/21  11/10/20  02/07/20  07/24/19    WBC 8.9 7.7 8.1 8.8 9.9 10.4   RBC 3.95 4.35 4.68 4.43 4.29 4.87   HGB 12.9 14.1 14.8 14.7 13.9 15.5   HCT 37.2 43.1 44.2 42.6 41.4 46.3 High    MCV 94.2 99.1 Abnormal  94.4 96.0 97 95.1   MCH 32.5 32.4 31.6 33.2 32.4 31.9   MCHC 34.5 32.7 33.5 34.5 33.6 33.5   RDW 13.5 13.5 11.9 12.1 12.8 13.1   Platelet count 045 984 423 High  327 462 High  395   Neutrophils % 59.4 63.9 57.6 64.4 51 63.6   Lymphocytes % 27.8 20.5 27.1 23.0 33 27.7   Monocytes % 10.2 13.4 9.4 8.8 11 5.9   Eosinophils % 2.4 1.9 4.2 3.1 4 2.0   Basophils % 0.2 0.3 1.2 0.7 1 0.8   Neutrophils Absolute 5.3 4.9 4.7 5.7 5.0 6.6   Lymphocytes Absolute 2.5 1.6 2.2 2.0 3.2 High  2.9   Monocytes Absolute 0.9 1.0 Abnormal  0.8 0.8 1.1 High  0.6   Eosinophils Absolute 0.2 0.2 0.3 0.3 0.4 0.2   Basophils Absolute 0.0 0.0 0.1 0.1 0.1 0.1     BHCG, Serum Preg Quantitative  Specimen:  Blood - Vein specimen (specimen)   Ref Range & Units 3 d ago   Beta HCG Quant mIU/mL 2,043.0    Resulting Agency  [id number] [place] [address]   Narrative  Performed by [id number] [place] [address]  Adult males ages 19-67 years: <= 1.0 mlU/ml   Non-pregnant females: <= 1-3 mlU/ml   0.2-1 week: 5-50 mlU/ml   1-2 weeks:  mlU/ml   2-3 weeks: 100-5000 mlU/ml   3-4 weeks: 500-10,000 mlU/ml   4-5 weeks: 1000-50,000 mlU/ml   5-6 weeks: 10,000-100,000 mlU/ml   6-8 weeks: 15,000-200,000 mlU/ml   2-3 months: 10,000-100,000 mlU/ml  Specimen Collected: 12/27/22 13:22 Last Resulted: 12/27/22 14:10   Received From: [facility]  Result Received: 12/30/22 09:25    View Encounter      Recent Data from [facility] to [facility], Serum Preg Quantitative  Component 12/27/22 12/25/22    Beta HCG Quant 2,043.0 2,412.0      Contains abnormal data CBC w/ Diff-Complete Blood Count  Specimen:  Blood   Ref Range & Units 5 d ago   WBC 4.0 - 10.5 K/uL 7.7    RBC 3.86 - 5.17 M/uL 4.35    HGB 12.1 - 15.8 g/dL 14.1    HCT 35.8 - 46.5 % 43.1    MCV 85.0 - 99.0 fl 99.1 Abnormal     MCH 28.4 - 33.4 pg 32.4    MCHC 31.1 - 37.0 g/dL 32.7    RDW 11.7 - 15.2 % 13.5    Platelet count 294 - 393 K/uL 340    Neutrophils % % 63.9    Lymphocytes % % 20.5    Monocytes % % 13.4    Eosinophils % % 1.9    Basophils % % 0.3    Neutrophils Absolute 2.0 - 7.3 K/uL 4.9    Lymphocytes Absolute 0.8 - 3.6 K/uL 1.6    Monocytes Absolute 0.3 - 0.9 K/uL 1.0 Abnormal     Eosinophils Absolute 0.0 - 0.4 K/uL 0.2    Basophils Absolute 0.0 - 0.1 K/uL 0.0    Resulting Agency  [id number] [facility]   Specimen Collected: 12/25/22 06:22 Last Resulted: 12/25/22 06:28   Received From: [facility]  Result Received: 12/30/22 09:25    View Encounter      Recent Data from [facility] to CBC w/ Diff-Complete Blood Count  Component 12/27/22  12/25/22  05/25/21  11/10/20  02/07/20  07/24/19    WBC 8.9 7.7 8.1 8.8 9.9 10.4   RBC 3.95 4.35 4.68 4.43 4.29 4.87   HGB 12.9 14.1 14.8 14.7 13.9 15.5   HCT 37.2 43.1 44.2 42.6 41.4 46.3 High    MCV 94.2 99.1 Abnormal  94.4 96.0 97 95.1   MCH 32.5 32.4 31.6 33.2 32.4 31.9   MCHC 34.5 32.7 33.5 34.5 33.6 33.5   RDW 13.5 13.5 11.9 12.1 12.8 13.1   Platelet count 334 340 423 High  327 462 High  395   Neutrophils % 59.4 63.9 57.6 64.4 51 63.6   Lymphocytes % 27.8 20.5 27.1 23.0 33 27.7   Monocytes % 10.2 13.4 9.4 8.8 11 5.9   Eosinophils % 2.4 1.9 4.2 3.1 4 2.0   Basophils % 0.2 0.3 1.2 0.7 1 0.8   Neutrophils Absolute 5.3 4.9 4.7 5.7 5.0 6.6   Lymphocytes Absolute 2.5 1.6 2.2 2.0 3.2 High  2.9   Monocytes Absolute 0.9 1.0 Abnormal  0.8 0.8 1.1 High  0.6   Eosinophils Absolute 0.2 0.2 0.3 0.3 0.4 0.2   Basophils Absolute 0.0 0.0 0.1 0.1 0.1 0.1     ABO/RH Type  Specimen:  Blood - Entire right upper arm (body structure)  Component 5 d ago   ABO  A    Rh  Positive    Resulting Agency [employer]   Specimen Collected: 22 06:22 Last Resulted: 22 12:34   Received From: [facility]  Result Received: 22 09:25    View Encounter      Beta HCG Quant  Specimen:  Blood - Swab of line insertion site (specimen)   Ref Range & Units 5 d ago   Beta HCG Quant mIU/mL 2,412.0    Resulting Agency  [id number] [facility]   Narrative  Performed by [id number] [place] [address]  Adult males ages 19-67 years: <= 1.0 mlU/ml   Non-pregnant females: <= 1-3 mlU/ml   0.2-1 week: 5-50 mlU/ml   1-2 weeks:  mlU/ml   2-3 weeks: 100-5000 mlU/ml   3-4 weeks: 500-10,000 mlU/ml   4-5 weeks: 1000-50,000 mlU/ml   5-6 weeks: 10,000-100,000 mlU/ml   6-8 weeks: 15,000-200,000 mlU/ml   2-3 months: 10,000-100,000 mlU/ml  Specimen Collected: 22 06:22 Last Resulted: 22 07:07   Received From: [facility]  Result Received: 22 09:25    View Encounter      Recent Data from [facility] to Beta HCG Quant  Component 22    Beta HCG Quant 2,043.0 2,412.0       Reviewed us 2522 and 22    No results found for this visit on 22. ASSESSMENT AND PLAN   Diagnosis Orders   1. Threatened   HCG, Quantitative, Pregnancy    CBC    US NON OB TRANSVAGINAL        Discussed hcg decreased from 2400 to . Discussed sab versus ectopic pregnancy    Labs today    Us Tuesday    To er for pain  Return in about 1 week (around 1/6/2023).     Emilio Alvarado MD

## 2022-12-31 LAB — GONADOTROPIN, CHORIONIC (HCG) QUANT: 1127 MIU/ML

## 2023-01-03 ENCOUNTER — OFFICE VISIT (OUTPATIENT)
Dept: OBGYN | Age: 23
End: 2023-01-03
Payer: COMMERCIAL

## 2023-01-03 VITALS
SYSTOLIC BLOOD PRESSURE: 131 MMHG | HEIGHT: 65 IN | DIASTOLIC BLOOD PRESSURE: 84 MMHG | BODY MASS INDEX: 39.82 KG/M2 | WEIGHT: 239 LBS

## 2023-01-03 DIAGNOSIS — N83.201 BILATERAL OVARIAN CYSTS: ICD-10-CM

## 2023-01-03 DIAGNOSIS — O20.0 THREATENED ABORTION: Primary | ICD-10-CM

## 2023-01-03 DIAGNOSIS — N83.202 BILATERAL OVARIAN CYSTS: ICD-10-CM

## 2023-01-03 LAB
GONADOTROPIN, CHORIONIC (HCG) QUANT: 399.6 MIU/ML
HCT VFR BLD CALC: 40.1 % (ref 36–48)
HEMOGLOBIN: 13.7 G/DL (ref 12–16)
MCH RBC QN AUTO: 32.8 PG (ref 26–34)
MCHC RBC AUTO-ENTMCNC: 34.1 G/DL (ref 31–36)
MCV RBC AUTO: 96.3 FL (ref 80–100)
PDW BLD-RTO: 12.6 % (ref 12.4–15.4)
PLATELET # BLD: 386 K/UL (ref 135–450)
PMV BLD AUTO: 9 FL (ref 5–10.5)
RBC # BLD: 4.16 M/UL (ref 4–5.2)
WBC # BLD: 14 K/UL (ref 4–11)

## 2023-01-03 PROCEDURE — 99214 OFFICE O/P EST MOD 30 MIN: CPT | Performed by: OBSTETRICS & GYNECOLOGY

## 2023-01-03 PROCEDURE — 36415 COLL VENOUS BLD VENIPUNCTURE: CPT | Performed by: OBSTETRICS & GYNECOLOGY

## 2023-01-03 RX ORDER — MEDROXYPROGESTERONE ACETATE 10 MG/1
20 TABLET ORAL DAILY
Qty: 20 TABLET | Refills: 0 | Status: SHIPPED | OUTPATIENT
Start: 2023-01-03 | End: 2023-01-13

## 2023-01-03 RX ORDER — TRANEXAMIC ACID 650 MG/1
1300 TABLET ORAL 3 TIMES DAILY
Qty: 30 TABLET | Refills: 0 | Status: SHIPPED | OUTPATIENT
Start: 2023-01-03 | End: 2023-01-08

## 2023-01-03 NOTE — PROGRESS NOTES
1/3/23    Cici Banks  2000    Chief Complaint   Patient presents with    Follow-up     Pt here to f/u on u/s.          Juju Bond is a 23 y.o. female who presents today for evaluation of bilateral ovarian ycsts    Past Medical History:   Diagnosis Date    Bipolar 1 disorder ([facility] [address]     Bleeding after intercourse     Epistaxis     Irregular menses     Irritable bowel syndrome     Migraines     Obesity     Seasonal allergies     Seizures ([facility] [address]     Urinary urgency        Past Surgical History:   Procedure Laterality Date    CHOLECYSTECTOMY         Social History     Tobacco Use    Smoking status: Never    Smokeless tobacco: Never   Vaping Use    Vaping Use: Never used   Substance Use Topics    Alcohol use: Not Currently    Drug use: Never       Family History   Problem Relation Age of Onset    Ovarian Cancer Paternal Grandmother     Cervical Cancer Paternal Grandmother     Diabetes Maternal Grandmother     Diabetes Father     Hypertension Father     Epilepsy Paternal Aunt     Epilepsy Paternal Grandfather     Epilepsy Paternal Cousin        Current Outpatient Medications   Medication Sig Dispense Refill    tranexamic acid (LYSTEDA) 650 MG TABS tablet Take 2 tablets by mouth 3 times daily for 5 days 30 tablet 0    medroxyPROGESTERone (PROVERA) 10 MG tablet Take 2 tablets by mouth daily for 10 days 20 tablet 0    norgestimate-ethinyl estradiol (ORTHO-CYCLEN) 0.25-35 MG-MCG per tablet Take 1 tablet by mouth daily 1 packet 11    albuterol sulfate  (90 Base) MCG/ACT inhaler Inhale 2 puffs into the lungs every 6 hours as needed      hydrOXYzine (VISTARIL) 25 MG capsule Take 25 mg by mouth nightly      lithium (ESKALITH) 450 MG extended release tablet Take 450 mg by mouth 2 times daily Takes in the afternoon and at HS      risperiDONE (RISPERDAL) 1 MG tablet Take 1 mg by mouth nightly       colestipol (COLESTID) 1 g tablet Take 2 g by mouth 2 times daily       lithium (ESKALITH) 450 MG extended release tablet Take 600 mg by mouth every morning Takes a  mg with the  to equal 600 mg q am.      topiramate (TOPAMAX) 50 MG tablet 50 mg 2 times daily  (Patient not taking: No sig reported)      ferrous sulfate (IRON SLOW RELEASE) 142 (45 Fe) MG extended release tablet Take 142 mg by mouth daily OTC       No current facility-administered medications for this visit. Allergies   Allergen Reactions    Sulfa Antibiotics Hives and Swelling           Immunization History   Administered Date(s) Administered    COVID-19, PFIZER PURPLE top, DILUTE for use, (age 15 y+), 30mcg/0.3mL 2021, 2021, 2022       Review of Systems    /84 (Site: Left Upper Arm, Position: Sitting, Cuff Size: Medium Adult)   Ht 5' 5\" (1.651 m)   Wt 239 lb (108.4 kg)   BMI 39.77 kg/m²     Physical Exam  Constitutional:       General: She is not in acute distress. Appearance: Normal appearance. She is not ill-appearing. HENT:      Head: Normocephalic. Nose: Nose normal.   Eyes:      General: No scleral icterus. Right eye: No discharge. Left eye: No discharge. Cardiovascular:      Pulses: Normal pulses. Pulmonary:      Effort: Pulmonary effort is normal.   Abdominal:      General: Abdomen is flat. There is no distension. Palpations: Abdomen is soft. There is no mass. Tenderness: There is no abdominal tenderness. Hernia: No hernia is present. Musculoskeletal:         General: Normal range of motion. Cervical back: Normal range of motion and neck supple. No rigidity. Skin:     General: Skin is warm and dry. Neurological:      General: No focal deficit present. Mental Status: She is alert and oriented to person, place, and time.    Psychiatric:         Mood and Affect: Mood normal.         Behavior: Behavior normal.     Resulting Agency  [id number] [place] [address]   Narrative  Performed by [address] [place] [address]  Adult males ages 19-67 years: <= 1.0 mlU/ml   Non-pregnant females: <= 1-3 mlU/ml   0.2-1 week: 5-50 mlU/ml   1-2 weeks:  mlU/ml   2-3 weeks: 100-5000 mlU/ml   3-4 weeks: 500-10,000 mlU/ml   4-5 weeks: 1000-50,000 mlU/ml   5-6 weeks: 10,000-100,000 mlU/ml   6-8 weeks: 15,000-200,000 mlU/ml   2-3 months: 10,000-100,000 mlU/ml  Specimen Collected: 22 13:22 Last Resulted: 22 14:10   Received From: [facility]  Result Received: 22 09:25    View Encounter      Recent Data from [facility], Serum Preg Quantitative  Component 22    Beta HCG Quant 2,043.0 2,412.0     ABO/RH Type  Specimen:  Blood - Entire right upper arm (body structure)  Component 9 d ago   ABO  A    Rh  Positive    Resulting Agency [address]   Specimen Collected: 22 06:22 Last Resulted: 22 12:34   Received From: [facility]  Result Received: 22 09:25     Component Ref Range & Units 22 1039    hCG Quant <5.0 mIU/mL 1127.0    Comment: Male: <5.0 mIU/ml      No results found for this visit on 23. ASSESSMENT AND PLAN   Diagnosis Orders   1. Threatened   HCG, Quantitative, Pregnancy    HCG, Quantitative, Pregnancy    CBC      2. Bilateral ovarian cysts          Discussed bilateral ovarian cysts and and area of concern for possible resolving ectopic or ovarian tissue attached to cyst.   Disucsed that hcg has decreased but we marsha to continue to follow up it closely. To er for any severe pain.hcg today, hcg Monday, repeat us 4 weeks, follow hcg until <5    Return in about 1 week (around 1/10/2023).     Arvin Zaragoza MD

## 2023-01-31 ENCOUNTER — OFFICE VISIT (OUTPATIENT)
Dept: OBGYN | Age: 23
End: 2023-01-31
Payer: COMMERCIAL

## 2023-01-31 VITALS — SYSTOLIC BLOOD PRESSURE: 111 MMHG | DIASTOLIC BLOOD PRESSURE: 70 MMHG | WEIGHT: 227 LBS | BODY MASS INDEX: 37.77 KG/M2

## 2023-01-31 DIAGNOSIS — N83.202 BILATERAL OVARIAN CYSTS: ICD-10-CM

## 2023-01-31 DIAGNOSIS — Z30.09 FAMILY PLANNING: ICD-10-CM

## 2023-01-31 DIAGNOSIS — O03.9 COMPLETE ABORTION: Primary | ICD-10-CM

## 2023-01-31 DIAGNOSIS — N83.201 BILATERAL OVARIAN CYSTS: ICD-10-CM

## 2023-01-31 DIAGNOSIS — N92.1 MENOMETRORRHAGIA: ICD-10-CM

## 2023-01-31 LAB
GONADOTROPIN, CHORIONIC (HCG) QUANT: <5 MIU/ML
HCT VFR BLD CALC: 45.9 % (ref 36–48)
HEMOGLOBIN: 14.7 G/DL (ref 12–16)
MCH RBC QN AUTO: 31.3 PG (ref 26–34)
MCHC RBC AUTO-ENTMCNC: 32.1 G/DL (ref 31–36)
MCV RBC AUTO: 97.5 FL (ref 80–100)
PDW BLD-RTO: 12.7 % (ref 12.4–15.4)
PLATELET # BLD: 374 K/UL (ref 135–450)
PMV BLD AUTO: 9.2 FL (ref 5–10.5)
RBC # BLD: 4.71 M/UL (ref 4–5.2)
TSH REFLEX FT4: 0.44 UIU/ML (ref 0.27–4.2)
WBC # BLD: 11.3 K/UL (ref 4–11)

## 2023-01-31 PROCEDURE — 36415 COLL VENOUS BLD VENIPUNCTURE: CPT | Performed by: OBSTETRICS & GYNECOLOGY

## 2023-01-31 PROCEDURE — 99214 OFFICE O/P EST MOD 30 MIN: CPT | Performed by: OBSTETRICS & GYNECOLOGY

## 2023-01-31 RX ORDER — NORGESTIMATE AND ETHINYL ESTRADIOL 0.25-0.035
1 KIT ORAL DAILY
Qty: 28 TABLET | Refills: 11 | Status: SHIPPED | OUTPATIENT
Start: 2023-01-31 | End: 2023-01-31

## 2023-02-03 LAB
SEX HORMONE BINDING GLOBULIN: 21 NMOL/L (ref 30–135)
TESTOSTERONE FREE-NONMALE: 11.2 PG/ML (ref 0.8–7.4)
TESTOSTERONE TOTAL: 49 NG/DL (ref 20–70)

## 2023-02-09 ENCOUNTER — PROCEDURE VISIT (OUTPATIENT)
Dept: OBGYN | Age: 23
End: 2023-02-09
Payer: COMMERCIAL

## 2023-02-09 VITALS
HEIGHT: 65 IN | DIASTOLIC BLOOD PRESSURE: 78 MMHG | SYSTOLIC BLOOD PRESSURE: 115 MMHG | BODY MASS INDEX: 39.32 KG/M2 | WEIGHT: 236 LBS

## 2023-02-09 DIAGNOSIS — N92.6 IRREGULAR MENSES: ICD-10-CM

## 2023-02-09 DIAGNOSIS — Z30.017 ENCOUNTER FOR INSERTION OF SUBDERMAL CONTRACEPTIVE: Primary | ICD-10-CM

## 2023-02-09 LAB
CONTROL: NORMAL
PREGNANCY TEST URINE, POC: NEGATIVE

## 2023-02-09 PROCEDURE — 11981 INSERTION DRUG DLVR IMPLANT: CPT

## 2023-02-09 PROCEDURE — 81025 URINE PREGNANCY TEST: CPT

## 2023-02-09 ASSESSMENT — ENCOUNTER SYMPTOMS
ABDOMINAL PAIN: 0
GASTROINTESTINAL NEGATIVE: 1
RESPIRATORY NEGATIVE: 1

## 2023-02-09 NOTE — PROGRESS NOTES
2/9/23    Cici Banks  2000    Chief Complaint   Patient presents with    Procedure     Pt here for nexplanon insertion due to irregular menses, consent signed. Rene Strong is a 23 y.o. female who presents today for evaluation of nexplanon insertion due to irregular menses.  No other concerns/complaints today    Past Medical History:   Diagnosis Date    Bipolar 1 disorder ([address]     Bleeding after intercourse     Epistaxis     Irregular menses     Irritable bowel syndrome     Migraines     Obesity     Seasonal allergies     Seizures ([facility])     Urinary urgency        Past Surgical History:   Procedure Laterality Date    CHOLECYSTECTOMY         Social History     Tobacco Use    Smoking status: Never    Smokeless tobacco: Never   Vaping Use    Vaping Use: Never used   Substance Use Topics    Alcohol use: Not Currently    Drug use: Never       Family History   Problem Relation Age of Onset    Ovarian Cancer Paternal Grandmother     Cervical Cancer Paternal Grandmother     Diabetes Maternal Grandmother     Diabetes Father     Hypertension Father     Epilepsy Paternal Aunt     Epilepsy Paternal Grandfather     Epilepsy Paternal Cousin        Current Outpatient Medications   Medication Sig Dispense Refill    albuterol sulfate  (90 Base) MCG/ACT inhaler Inhale 2 puffs into the lungs every 6 hours as needed      hydrOXYzine (VISTARIL) 25 MG capsule Take 25 mg by mouth nightly      lithium (ESKALITH) 450 MG extended release tablet Take 450 mg by mouth 2 times daily Takes in the afternoon and at HS      risperiDONE (RISPERDAL) 1 MG tablet Take 1 mg by mouth nightly       colestipol (COLESTID) 1 g tablet Take 2 g by mouth 2 times daily       lithium (ESKALITH) 450 MG extended release tablet Take 600 mg by mouth every morning Takes a  mg with the  to equal 600 mg q am.      ferrous sulfate (IRON SLOW RELEASE) 142 (45 Fe) MG extended release tablet Take 142 mg by mouth daily OTC       Current Facility-Administered Medications   Medication Dose Route Frequency Provider Last Rate Last Admin    etonogestrel (NEXPLANON) implant 68 mg  68 mg Subdermal Once Keith Vyas PA-C   68 mg at 23 1437       Allergies   Allergen Reactions    Sulfa Antibiotics Hives and Swelling           Immunization History   Administered Date(s) Administered    COVID-19, PFIZER PURPLE top, DILUTE for use, (age 15 y+), 30mcg/0.3mL 2021, 2021, 2022       Review of Systems   Constitutional: Negative. Negative for fatigue and fever. Respiratory: Negative. Gastrointestinal: Negative. Negative for abdominal pain. Endocrine: Negative. Genitourinary:  Positive for menstrual problem. Negative for vaginal pain. Musculoskeletal: Negative. Skin: Negative. Neurological: Negative. Negative for dizziness and headaches. Psychiatric/Behavioral: Negative. /78 (Site: Left Upper Arm, Position: Sitting, Cuff Size: Medium Adult)   Ht 5' 5\" (1.651 m)   Wt 236 lb (107 kg)   BMI 39.27 kg/m²     Physical Exam  Vitals and nursing note reviewed. Constitutional:       General: She is not in acute distress. Appearance: Normal appearance. She is obese. HENT:      Head: Normocephalic and atraumatic. Pulmonary:      Effort: Pulmonary effort is normal. No respiratory distress. Musculoskeletal:         General: Normal range of motion. Cervical back: Normal range of motion. Skin:     General: Skin is warm and dry. Neurological:      General: No focal deficit present. Mental Status: She is alert and oriented to person, place, and time. Psychiatric:         Mood and Affect: Mood normal.         Speech: Speech normal.         Behavior: Behavior normal. Behavior is cooperative. Thought Content:  Thought content normal.       Results for orders placed or performed in visit on 23   POCT urine pregnancy   Result Value Ref Range    Preg Test, Ur negative Control         ASSESSMENT AND PLAN   Diagnosis Orders   1. Encounter for insertion of subdermal contraceptive        2. Irregular menses  etonogestrel (NEXPLANON) implant 68 mg    POCT urine pregnancy        Nexplanon Contraceptive Implant   Insertion Note    The pt is a 23 y.o. Donavan Raymundo who presents today for Insertion of a subdermal contraceptive implant. She has been counseled regarding the risks, benefits and alternatives of the procedure. She has signed the consent form, and wishes to proceed with insertion today. Current method of contraception: none    Desired future contraception: Nexplanon     Pregnancy test: negative     Procedure Details  The inner side of the Left arm was cleaned with Betadine and infiltrated with  1% lidocaine with epinephrine. The contraceptive shira was then inserted according to the 's instructions without complications. The shira was palpable under the skin after the insertion. The patient was instructed to palpate the implant. Type of Device: Nexplanon    Needs Removal / Exchange: February 2026     The insertion site was closed with steri-strips and coban dressing. The patient tolerated the procedure without complications. [address] 6084-5136-72  1 unit    Patient educated on signs of infection, such as: insertion site redness, warmth, pain, fever, fatigue, headache. Reviewed potential for irregular bleeding patterns with Nexplanon. Patient encouraged to call or return if any post-insertion complications or issues arise. All questions and concerns addressed. Return if symptoms worsen or fail to improve.     Chel Mcleod PA-C

## 2023-10-05 ENCOUNTER — OFFICE VISIT (OUTPATIENT)
Dept: OBGYN | Age: 23
End: 2023-10-05
Payer: COMMERCIAL

## 2023-10-05 VITALS
DIASTOLIC BLOOD PRESSURE: 77 MMHG | SYSTOLIC BLOOD PRESSURE: 115 MMHG | HEIGHT: 65 IN | HEART RATE: 87 BPM | BODY MASS INDEX: 40.98 KG/M2 | WEIGHT: 246 LBS

## 2023-10-05 DIAGNOSIS — Z01.419 WOMEN'S ANNUAL ROUTINE GYNECOLOGICAL EXAMINATION: Primary | ICD-10-CM

## 2023-10-05 DIAGNOSIS — Z11.3 SCREENING EXAMINATION FOR STD (SEXUALLY TRANSMITTED DISEASE): ICD-10-CM

## 2023-10-05 DIAGNOSIS — Z30.46 NEXPLANON REMOVAL: ICD-10-CM

## 2023-10-05 PROCEDURE — 11982 REMOVE DRUG IMPLANT DEVICE: CPT

## 2023-10-05 PROCEDURE — 99395 PREV VISIT EST AGE 18-39: CPT

## 2023-10-05 RX ORDER — LAMOTRIGINE 25 MG/1
75 TABLET ORAL DAILY
COMMUNITY

## 2023-10-05 RX ORDER — ARIPIPRAZOLE 2 MG/1
2 TABLET ORAL DAILY
COMMUNITY

## 2023-10-05 RX ORDER — BUSPIRONE HYDROCHLORIDE 15 MG/1
15 TABLET ORAL 3 TIMES DAILY
COMMUNITY

## 2023-10-05 ASSESSMENT — PATIENT HEALTH QUESTIONNAIRE - PHQ9
3. TROUBLE FALLING OR STAYING ASLEEP: 3
8. MOVING OR SPEAKING SO SLOWLY THAT OTHER PEOPLE COULD HAVE NOTICED. OR THE OPPOSITE, BEING SO FIGETY OR RESTLESS THAT YOU HAVE BEEN MOVING AROUND A LOT MORE THAN USUAL: 3
10. IF YOU CHECKED OFF ANY PROBLEMS, HOW DIFFICULT HAVE THESE PROBLEMS MADE IT FOR YOU TO DO YOUR WORK, TAKE CARE OF THINGS AT HOME, OR GET ALONG WITH OTHER PEOPLE: 1
4. FEELING TIRED OR HAVING LITTLE ENERGY: 3
9. THOUGHTS THAT YOU WOULD BE BETTER OFF DEAD, OR OF HURTING YOURSELF: 0
2. FEELING DOWN, DEPRESSED OR HOPELESS: 2
1. LITTLE INTEREST OR PLEASURE IN DOING THINGS: 2
7. TROUBLE CONCENTRATING ON THINGS, SUCH AS READING THE NEWSPAPER OR WATCHING TELEVISION: 3
SUM OF ALL RESPONSES TO PHQ QUESTIONS 1-9: 22
SUM OF ALL RESPONSES TO PHQ QUESTIONS 1-9: 22
5. POOR APPETITE OR OVEREATING: 3
6. FEELING BAD ABOUT YOURSELF - OR THAT YOU ARE A FAILURE OR HAVE LET YOURSELF OR YOUR FAMILY DOWN: 3
SUM OF ALL RESPONSES TO PHQ9 QUESTIONS 1 & 2: 4
SUM OF ALL RESPONSES TO PHQ QUESTIONS 1-9: 22
SUM OF ALL RESPONSES TO PHQ QUESTIONS 1-9: 22

## 2023-10-05 ASSESSMENT — COLUMBIA-SUICIDE SEVERITY RATING SCALE - C-SSRS
6. HAVE YOU EVER DONE ANYTHING, STARTED TO DO ANYTHING, OR PREPARED TO DO ANYTHING TO END YOUR LIFE?: NO
1. WITHIN THE PAST MONTH, HAVE YOU WISHED YOU WERE DEAD OR WISHED YOU COULD GO TO SLEEP AND NOT WAKE UP?: NO
2. HAVE YOU ACTUALLY HAD ANY THOUGHTS OF KILLING YOURSELF?: NO

## 2023-10-05 ASSESSMENT — ENCOUNTER SYMPTOMS
ABDOMINAL PAIN: 0
RESPIRATORY NEGATIVE: 1
GASTROINTESTINAL NEGATIVE: 1

## 2023-10-05 NOTE — PROGRESS NOTES
10/5/23    Cici Banks  2000    Chief Complaint   Patient presents with    Gynecologic Exam     Pt here for annual, is sexually active, irregular menses, LMP x 6 months, bc-nexplanon, pap-2022-neg. Procedure     Pt requesting nexplanon to be removed d/t arm pain. Consent signed. Inserted 3/9/2023. The patient is a 20 y.o. female, Cassidy No who presents for her annual exam.  She is menstruating abnormally. She is  sexually active. She is currently taking birth control. Birth control method is Nexplanon    She reports additional symptoms of arm pain and irregular menses . Pt desires nexplanon removal today    Pap smear history: Her last PAP smear was in .   Her results were normal.    Past Medical History:   Diagnosis Date    ADHD     Bipolar 1 disorder ([address])     Bleeding after intercourse     Epistaxis     Irregular menses     Irritable bowel syndrome     Migraines     Missed      Obesity     Seasonal allergies     Seizures ([address])     Urinary urgency        Past Surgical History:   Procedure Laterality Date    CHOLECYSTECTOMY      WISDOM TOOTH EXTRACTION         Family History   Problem Relation Age of Onset    Ovarian Cancer Paternal Grandmother     Cervical Cancer Paternal Grandmother     Diabetes Maternal Grandmother     Diabetes Father     Hypertension Father     Epilepsy Paternal Aunt     Epilepsy Paternal Grandfather     Epilepsy Paternal Cousin        Social History     Tobacco Use    Smoking status: Never    Smokeless tobacco: Never   Vaping Use    Vaping Use: Every day    Substances: Nicotine   Substance Use Topics    Alcohol use: Not Currently    Drug use: Never       Current Outpatient Medications   Medication Sig Dispense Refill    ARIPiprazole (ABILIFY) 2 MG tablet Take 1 tablet by mouth daily      lamoTRIgine (LAMICTAL) 25 MG tablet Take 3 tablets by mouth daily      busPIRone (BUSPAR) 15 MG tablet Take 15 mg by mouth 3 times daily      Omega-3 Fatty Acids (FISH OIL

## 2023-10-09 LAB
C TRACH DNA CVX QL NAA+PROBE: NEGATIVE
N GONORRHOEA DNA CERV MUCUS QL NAA+PROBE: NEGATIVE

## 2024-03-18 ENCOUNTER — OFFICE VISIT (OUTPATIENT)
Dept: OBGYN | Age: 24
End: 2024-03-18
Payer: COMMERCIAL

## 2024-03-18 VITALS
SYSTOLIC BLOOD PRESSURE: 125 MMHG | DIASTOLIC BLOOD PRESSURE: 77 MMHG | HEIGHT: 65 IN | BODY MASS INDEX: 44.65 KG/M2 | WEIGHT: 268 LBS

## 2024-03-18 DIAGNOSIS — N92.6 IRREGULAR MENSES: ICD-10-CM

## 2024-03-18 DIAGNOSIS — N89.8 VAGINAL DISCHARGE: Primary | ICD-10-CM

## 2024-03-18 DIAGNOSIS — N74 FEMALE PELVIC INFLAMMATORY DISORDERS IN DISEASES CLASSIFIED ELSEWHERE: ICD-10-CM

## 2024-03-18 DIAGNOSIS — E28.2 PCOS (POLYCYSTIC OVARIAN SYNDROME): ICD-10-CM

## 2024-03-18 PROCEDURE — 99214 OFFICE O/P EST MOD 30 MIN: CPT | Performed by: OBSTETRICS & GYNECOLOGY

## 2024-03-18 RX ORDER — METFORMIN HYDROCHLORIDE 500 MG/1
1500 TABLET, EXTENDED RELEASE ORAL
Qty: 90 TABLET | Refills: 11 | Status: SHIPPED | OUTPATIENT
Start: 2024-03-18 | End: 2025-03-13

## 2024-03-18 RX ORDER — ETONOGESTREL AND ETHINYL ESTRADIOL VAGINAL RING .015; .12 MG/D; MG/D
1 RING VAGINAL
Qty: 3 EACH | Refills: 3 | Status: SHIPPED | OUTPATIENT
Start: 2024-03-18

## 2024-03-18 RX ORDER — NORETHINDRONE ACETATE AND ETHINYL ESTRADIOL AND FERROUS FUMARATE 5-7-9-7
1 KIT ORAL DAILY
Qty: 1 PACKET | Refills: 11 | Status: SHIPPED | OUTPATIENT
Start: 2024-03-18 | End: 2024-03-18

## 2024-03-18 RX ORDER — BIOTIN 1 MG
TABLET ORAL
COMMUNITY

## 2024-03-18 RX ORDER — METOPROLOL SUCCINATE 25 MG/1
25 TABLET, EXTENDED RELEASE ORAL DAILY
COMMUNITY

## 2024-03-18 RX ORDER — METRONIDAZOLE 500 MG/1
500 TABLET ORAL 2 TIMES DAILY
Qty: 14 TABLET | Refills: 0 | Status: SHIPPED | OUTPATIENT
Start: 2024-03-18 | End: 2024-03-25

## 2024-03-18 ASSESSMENT — PATIENT HEALTH QUESTIONNAIRE - PHQ9
SUM OF ALL RESPONSES TO PHQ QUESTIONS 1-9: 18
SUM OF ALL RESPONSES TO PHQ QUESTIONS 1-9: 18
1. LITTLE INTEREST OR PLEASURE IN DOING THINGS: MORE THAN HALF THE DAYS
8. MOVING OR SPEAKING SO SLOWLY THAT OTHER PEOPLE COULD HAVE NOTICED. OR THE OPPOSITE, BEING SO FIGETY OR RESTLESS THAT YOU HAVE BEEN MOVING AROUND A LOT MORE THAN USUAL: NOT AT ALL
SUM OF ALL RESPONSES TO PHQ QUESTIONS 1-9: 18
7. TROUBLE CONCENTRATING ON THINGS, SUCH AS READING THE NEWSPAPER OR WATCHING TELEVISION: NEARLY EVERY DAY
4. FEELING TIRED OR HAVING LITTLE ENERGY: NEARLY EVERY DAY
3. TROUBLE FALLING OR STAYING ASLEEP: NEARLY EVERY DAY
SUM OF ALL RESPONSES TO PHQ9 QUESTIONS 1 & 2: 4
10. IF YOU CHECKED OFF ANY PROBLEMS, HOW DIFFICULT HAVE THESE PROBLEMS MADE IT FOR YOU TO DO YOUR WORK, TAKE CARE OF THINGS AT HOME, OR GET ALONG WITH OTHER PEOPLE: VERY DIFFICULT
9. THOUGHTS THAT YOU WOULD BE BETTER OFF DEAD, OR OF HURTING YOURSELF: NOT AT ALL
2. FEELING DOWN, DEPRESSED OR HOPELESS: MORE THAN HALF THE DAYS
5. POOR APPETITE OR OVEREATING: NEARLY EVERY DAY
SUM OF ALL RESPONSES TO PHQ QUESTIONS 1-9: 18
6. FEELING BAD ABOUT YOURSELF - OR THAT YOU ARE A FAILURE OR HAVE LET YOURSELF OR YOUR FAMILY DOWN: MORE THAN HALF THE DAYS

## 2024-03-18 NOTE — PROGRESS NOTES
3/18/24    Cici Banks  2000    Chief Complaint   Patient presents with    Vaginal Discharge     Pt c/o white vaginal discharge and odor x 1 wk.     Other     Pt wants to discuss starting bc, hx of irregular menses.         Cici Banks is a 23 y.o. female who presents today for evaluation of vaginal discharge and irregular menses    Past Medical History:   Diagnosis Date    ADHD     Bipolar 1 disorder ([facility])     Bleeding after intercourse     Epistaxis     Irregular menses     Irritable bowel syndrome     Migraines     Missed      Obesity     Seasonal allergies     Seizures ([facility])     Urinary urgency     Vaginal discharge     Vaginal odor        Past Surgical History:   Procedure Laterality Date    CHOLECYSTECTOMY      WISDOM TOOTH EXTRACTION         Social History     Tobacco Use    Smoking status: Never    Smokeless tobacco: Never   Vaping Use    Vaping Use: Every day    Substances: Nicotine   Substance Use Topics    Alcohol use: Not Currently    Drug use: Never       Family History   Problem Relation Age of Onset    Ovarian Cancer Paternal Grandmother     Cervical Cancer Paternal Grandmother     Diabetes Maternal Grandmother     Diabetes Father     Hypertension Father     Epilepsy Paternal Aunt     Epilepsy Paternal Grandfather     Epilepsy Paternal Cousin        Current Outpatient Medications   Medication Sig Dispense Refill    metoprolol succinate (TOPROL XL) 25 MG extended release tablet Take 1 tablet by mouth daily      Biotin 1000 MCG TABS Take by mouth      metFORMIN (GLUCOPHAGE-XR) 500 MG extended release tablet Take 3 tablets by mouth daily (with breakfast) 90 tablet 11    etonogestrel-ethinyl estradiol (NUVARING) 0.12-0.015 MG/24HR vaginal ring Place 1 each vaginally every 21 days Insert one (1) ring vaginally and leave in place for three (3) weeks, then remove for one (1) week. 3 each 3    metroNIDAZOLE (FLAGYL) 500 MG tablet Take 1 tablet by mouth 2 times daily for 7 days 14 tablet

## 2024-03-19 LAB
C TRACH DNA CVX QL NAA+PROBE: NEGATIVE
CANDIDA DNA VAG QL NAA+PROBE: ABNORMAL
G VAGINALIS DNA SPEC QL NAA+PROBE: ABNORMAL
N GONORRHOEA DNA CERV MUCUS QL NAA+PROBE: NEGATIVE
T VAGINALIS DNA VAG QL NAA+PROBE: ABNORMAL

## 2025-04-02 ENCOUNTER — HOSPITAL ENCOUNTER (OUTPATIENT)
Age: 25
Setting detail: SPECIMEN
Discharge: HOME | End: 2025-04-02
Payer: COMMERCIAL

## 2025-04-02 ENCOUNTER — OFFICE VISIT (OUTPATIENT)
Dept: OBGYN | Age: 25
End: 2025-04-02
Payer: COMMERCIAL

## 2025-04-02 VITALS
WEIGHT: 279.2 LBS | HEART RATE: 100 BPM | SYSTOLIC BLOOD PRESSURE: 118 MMHG | BODY MASS INDEX: 46.52 KG/M2 | DIASTOLIC BLOOD PRESSURE: 71 MMHG | HEIGHT: 65 IN

## 2025-04-02 DIAGNOSIS — N64.4 MASTALGIA: ICD-10-CM

## 2025-04-02 DIAGNOSIS — Z01.419 ENCOUNTER FOR ANNUAL ROUTINE GYNECOLOGICAL EXAMINATION: Primary | ICD-10-CM

## 2025-04-02 PROCEDURE — 99459 PELVIC EXAMINATION: CPT

## 2025-04-02 PROCEDURE — G0123 SCREEN CERV/VAG THIN LAYER: HCPCS

## 2025-04-02 PROCEDURE — 99395 PREV VISIT EST AGE 18-39: CPT

## 2025-04-02 SDOH — ECONOMIC STABILITY: FOOD INSECURITY: WITHIN THE PAST 12 MONTHS, THE FOOD YOU BOUGHT JUST DIDN'T LAST AND YOU DIDN'T HAVE MONEY TO GET MORE.: NEVER TRUE

## 2025-04-02 SDOH — ECONOMIC STABILITY: FOOD INSECURITY: WITHIN THE PAST 12 MONTHS, YOU WORRIED THAT YOUR FOOD WOULD RUN OUT BEFORE YOU GOT MONEY TO BUY MORE.: NEVER TRUE

## 2025-04-02 ASSESSMENT — ENCOUNTER SYMPTOMS
SHORTNESS OF BREATH: 0
RESPIRATORY NEGATIVE: 1
CHEST TIGHTNESS: 0
VOMITING: 0
CONSTIPATION: 0
NAUSEA: 0
DIARRHEA: 0
GASTROINTESTINAL NEGATIVE: 1
ABDOMINAL PAIN: 0

## 2025-04-02 ASSESSMENT — PATIENT HEALTH QUESTIONNAIRE - PHQ9
SUM OF ALL RESPONSES TO PHQ QUESTIONS 1-9: 0
1. LITTLE INTEREST OR PLEASURE IN DOING THINGS: NOT AT ALL
2. FEELING DOWN, DEPRESSED OR HOPELESS: NOT AT ALL
SUM OF ALL RESPONSES TO PHQ QUESTIONS 1-9: 0

## 2025-04-02 NOTE — PROGRESS NOTES
Cognition and Memory: Cognition normal.         Judgment: Judgment normal.         No results found for this visit on 04/02/25.    Assessment and Plan   Diagnosis Orders   1. Encounter for annual routine gynecological examination  PAP SMEAR      2. Mastalgia  US LEFT BREAST LIMITED    US RIGHT BREAST LIMITED      Chaperone Rachele Bardales was present for the entire examination.    Understands we will notify her of abnormal results.     Return in about 1 year (around 4/2/2026) for annual exam.    Tresa Vargas PA-C

## 2025-04-07 LAB — GYNECOLOGY CYTOLOGY REPORT: NORMAL

## 2025-05-21 ENCOUNTER — HOSPITAL ENCOUNTER (OUTPATIENT)
Dept: ULTRASOUND IMAGING | Age: 25
Discharge: HOME | End: 2025-05-21
Payer: COMMERCIAL

## 2025-05-21 ENCOUNTER — RESULTS FOLLOW-UP (OUTPATIENT)
Dept: OBGYN | Age: 25
End: 2025-05-21

## 2025-05-21 DIAGNOSIS — N64.4 MASTALGIA: ICD-10-CM

## 2025-05-21 PROCEDURE — 76642 ULTRASOUND BREAST LIMITED: CPT

## 2025-06-13 DIAGNOSIS — E28.2 PCOS (POLYCYSTIC OVARIAN SYNDROME): ICD-10-CM

## 2025-06-13 RX ORDER — METFORMIN HYDROCHLORIDE 500 MG/1
1500 TABLET, EXTENDED RELEASE ORAL
Qty: 270 TABLET | Refills: 3 | OUTPATIENT
Start: 2025-06-13 | End: 2026-06-08

## 2025-08-26 ENCOUNTER — INITIAL PRENATAL (OUTPATIENT)
Dept: OBGYN | Age: 25
End: 2025-08-26
Payer: COMMERCIAL

## 2025-08-26 VITALS
WEIGHT: 282.2 LBS | DIASTOLIC BLOOD PRESSURE: 68 MMHG | SYSTOLIC BLOOD PRESSURE: 113 MMHG | BODY MASS INDEX: 46.96 KG/M2 | HEART RATE: 88 BPM

## 2025-08-26 DIAGNOSIS — O99.210 MATERNAL MORBID OBESITY, ANTEPARTUM (HCC): ICD-10-CM

## 2025-08-26 DIAGNOSIS — J45.909 UNCOMPLICATED ASTHMA, UNSPECIFIED ASTHMA SEVERITY, UNSPECIFIED WHETHER PERSISTENT: ICD-10-CM

## 2025-08-26 DIAGNOSIS — O09.91 SUPERVISION OF HIGH RISK PREGNANCY IN FIRST TRIMESTER: Primary | ICD-10-CM

## 2025-08-26 DIAGNOSIS — I47.10 SVT (SUPRAVENTRICULAR TACHYCARDIA): ICD-10-CM

## 2025-08-26 DIAGNOSIS — E66.01 MATERNAL MORBID OBESITY, ANTEPARTUM (HCC): ICD-10-CM

## 2025-08-26 DIAGNOSIS — Z3A.12 12 WEEKS GESTATION OF PREGNANCY: ICD-10-CM

## 2025-08-26 PROCEDURE — 0500F INITIAL PRENATAL CARE VISIT: CPT | Performed by: NURSE PRACTITIONER

## 2025-08-26 PROCEDURE — 36415 COLL VENOUS BLD VENIPUNCTURE: CPT | Performed by: NURSE PRACTITIONER

## 2025-08-26 ASSESSMENT — ENCOUNTER SYMPTOMS
ABDOMINAL PAIN: 0
CONSTIPATION: 0
VOMITING: 0
DIARRHEA: 0
NAUSEA: 0
GASTROINTESTINAL NEGATIVE: 1
SHORTNESS OF BREATH: 0
RESPIRATORY NEGATIVE: 1

## 2025-08-27 LAB
ABO/RH: NORMAL
ANTIBODY SCREEN: NORMAL
BASOPHILS # BLD: 0 K/UL (ref 0–0.2)
BASOPHILS NFR BLD: 0 %
C TRACH DNA UR QL NAA+PROBE: NEGATIVE
DEPRECATED RDW RBC AUTO: 13.6 % (ref 12.4–15.4)
EOSINOPHIL # BLD: 0.3 K/UL (ref 0–0.6)
EOSINOPHIL NFR BLD: 3 %
EST. AVERAGE GLUCOSE BLD GHB EST-MCNC: 96.8 MG/DL
HBA1C MFR BLD: 5 %
HBV SURFACE AG SERPL QL IA: NORMAL
HCT VFR BLD AUTO: 38.6 % (ref 36–48)
HGB BLD-MCNC: 13.3 G/DL (ref 12–16)
HIV 1+2 AB+HIV1 P24 AG SERPL QL IA: NORMAL
HIV 2 AB SERPL QL IA: NORMAL
HIV1 AB SERPL QL IA: NORMAL
HIV1 P24 AG SERPL QL IA: NORMAL
LYMPHOCYTES # BLD: 2.2 K/UL (ref 1–5.1)
LYMPHOCYTES NFR BLD: 23 %
MCH RBC QN AUTO: 32.9 PG (ref 26–34)
MCHC RBC AUTO-ENTMCNC: 34.4 G/DL (ref 31–36)
MCV RBC AUTO: 95.8 FL (ref 80–100)
MONOCYTES # BLD: 0.4 K/UL (ref 0–1.3)
MONOCYTES NFR BLD: 4 %
N GONORRHOEA DNA UR QL NAA+PROBE: NEGATIVE
NEUTROPHILS # BLD: 6.7 K/UL (ref 1.7–7.7)
NEUTROPHILS NFR BLD: 70 %
PLATELET # BLD AUTO: 290 K/UL (ref 135–450)
PMV BLD AUTO: 9.2 FL (ref 5–10.5)
RBC # BLD AUTO: 4.03 M/UL (ref 4–5.2)
RBC MORPH BLD: NORMAL
REAGIN+T PALLIDUM IGG+IGM SERPL-IMP: NORMAL
RUBV IGG SERPL IA-ACNC: 39 IU/ML
WBC # BLD AUTO: 9.6 K/UL (ref 4–11)

## 2025-08-28 ENCOUNTER — CLINICAL SUPPORT (OUTPATIENT)
Dept: OBGYN | Age: 25
End: 2025-08-28
Payer: COMMERCIAL

## 2025-08-28 VITALS
BODY MASS INDEX: 46.98 KG/M2 | SYSTOLIC BLOOD PRESSURE: 117 MMHG | WEIGHT: 282 LBS | HEART RATE: 75 BPM | DIASTOLIC BLOOD PRESSURE: 55 MMHG | HEIGHT: 65 IN

## 2025-08-28 DIAGNOSIS — N30.00 ACUTE CYSTITIS WITHOUT HEMATURIA: Primary | ICD-10-CM

## 2025-08-28 LAB
BACTERIA UR CULT: ABNORMAL
HCV RNA SERPL NAA+PROBE-ACNC: NOT DETECTED IU/ML
HCV RNA SERPL NAA+PROBE-LOG IU: NOT DETECTED LOG IU/ML
HCV RNA SERPL QL NAA+PROBE: NOT DETECTED
ORGANISM: ABNORMAL

## 2025-08-28 PROCEDURE — 96372 THER/PROPH/DIAG INJ SC/IM: CPT | Performed by: NURSE PRACTITIONER

## 2025-08-28 RX ORDER — CEFTRIAXONE 500 MG/1
500 INJECTION, POWDER, FOR SOLUTION INTRAMUSCULAR; INTRAVENOUS ONCE
Status: COMPLETED | OUTPATIENT
Start: 2025-08-28 | End: 2025-08-28

## 2025-08-28 RX ORDER — CEFTRIAXONE 500 MG/1
500 INJECTION, POWDER, FOR SOLUTION INTRAMUSCULAR; INTRAVENOUS ONCE
Qty: 1 EACH | Refills: 0
Start: 2025-08-28 | End: 2025-08-28

## 2025-08-28 RX ADMIN — CEFTRIAXONE 500 MG: 500 INJECTION, POWDER, FOR SOLUTION INTRAMUSCULAR; INTRAVENOUS at 14:59

## 2025-08-31 LAB
Lab: NORMAL
NTRA FETAL FRACTION: NORMAL
NTRA FETAL RHD SUMMARY: NORMAL
NTRA GENDER OF FETUS: NORMAL
NTRA MONOSOMY X AGE-BASED RISK TEXT: NORMAL
NTRA MONOSOMY X RESULT TEXT: NORMAL
NTRA MONOSOMY X RISK SCORE TEXT: NORMAL
NTRA TRIPLOIDY RESULT TEXT: NORMAL
NTRA TRISOMY 13 AGE-BASED RISK TEXT: NORMAL
NTRA TRISOMY 13 RESULT TEXT: NORMAL
NTRA TRISOMY 13 RISK SCORE TEXT: NORMAL
NTRA TRISOMY 18 AGE-BASED RISK TEXT: NORMAL
NTRA TRISOMY 18 RESULT TEXT: NORMAL
NTRA TRISOMY 18 RISK SCORE TEXT: NORMAL
NTRA TRISOMY 21 AGE-BASED RISK TEXT: NORMAL
NTRA TRISOMY 21 RESULT TEXT: NORMAL
NTRA TRISOMY 21 RISK SCORE TEXT: NORMAL